# Patient Record
Sex: FEMALE | Race: WHITE | NOT HISPANIC OR LATINO | Employment: OTHER | URBAN - METROPOLITAN AREA
[De-identification: names, ages, dates, MRNs, and addresses within clinical notes are randomized per-mention and may not be internally consistent; named-entity substitution may affect disease eponyms.]

---

## 2017-08-06 ENCOUNTER — HOSPITAL ENCOUNTER (INPATIENT)
Facility: HOSPITAL | Age: 82
LOS: 3 days | Discharge: NURSING, REHAB OR LONG-TERM CARE | DRG: 481 | End: 2017-08-09
Attending: EMERGENCY MEDICINE | Admitting: INTERNAL MEDICINE
Payer: MEDICARE

## 2017-08-06 DIAGNOSIS — Z01.818 PREOPERATIVE CLEARANCE: ICD-10-CM
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
## 2017-08-06 DIAGNOSIS — M25.552 LEFT HIP PAIN: ICD-10-CM

## 2017-08-06 DIAGNOSIS — S72.002A CLOSED FRACTURE OF LEFT HIP REQUIRING OPERATIVE REPAIR, INITIAL ENCOUNTER: ICD-10-CM

## 2017-08-06 DIAGNOSIS — Z01.818 PRE-OP EVALUATION: ICD-10-CM

## 2017-08-06 DIAGNOSIS — Z87.81 S/P ORIF (OPEN REDUCTION INTERNAL FIXATION) FRACTURE: ICD-10-CM

## 2017-08-06 DIAGNOSIS — Z98.890 S/P ORIF (OPEN REDUCTION INTERNAL FIXATION) FRACTURE: ICD-10-CM

## 2017-08-06 DIAGNOSIS — S72.002A CLOSED LEFT HIP FRACTURE: ICD-10-CM

## 2017-08-06 DIAGNOSIS — Z01.811 PRE-OP CHEST EXAM: ICD-10-CM

## 2017-08-06 DIAGNOSIS — S72.002A CLOSED LEFT HIP FRACTURE, INITIAL ENCOUNTER: Primary | ICD-10-CM

## 2017-08-06 DIAGNOSIS — D62 ACUTE BLOOD LOSS ANEMIA: Chronic | ICD-10-CM

## 2017-08-06 PROBLEM — F03.90 DEMENTIA: Chronic | Status: ACTIVE | Noted: 2017-08-06
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
## 2017-08-06 LAB
ABO + RH BLD: NORMAL
ALBUMIN SERPL BCP-MCNC: 3.2 G/DL
ALP SERPL-CCNC: 64 U/L
ALT SERPL W/O P-5'-P-CCNC: 26 U/L
ANION GAP SERPL CALC-SCNC: 9 MMOL/L
AORTIC VALVE REGURGITATION: NORMAL
APTT BLDCRRT: 26.5 SEC
AST SERPL-CCNC: 29 U/L
BASOPHILS # BLD AUTO: 0.02 K/UL
BASOPHILS NFR BLD: 0.3 %
BILIRUB SERPL-MCNC: 0.8 MG/DL
BLD GP AB SCN CELLS X3 SERPL QL: NORMAL
BUN SERPL-MCNC: 13 MG/DL
CALCIUM SERPL-MCNC: 8.5 MG/DL
CHLORIDE SERPL-SCNC: 104 MMOL/L
CK SERPL-CCNC: 66 U/L
CO2 SERPL-SCNC: 24 MMOL/L
CREAT SERPL-MCNC: 0.6 MG/DL
DIASTOLIC DYSFUNCTION: NO
DIFFERENTIAL METHOD: ABNORMAL
EOSINOPHIL # BLD AUTO: 0 K/UL
EOSINOPHIL NFR BLD: 0.3 %
ERYTHROCYTE [DISTWIDTH] IN BLOOD BY AUTOMATED COUNT: 13.5 %
EST. GFR  (AFRICAN AMERICAN): >60 ML/MIN/1.73 M^2
EST. GFR  (NON AFRICAN AMERICAN): >60 ML/MIN/1.73 M^2
ESTIMATED PA SYSTOLIC PRESSURE: 16.65
GLUCOSE SERPL-MCNC: 95 MG/DL
HCT VFR BLD AUTO: 35.3 %
HGB BLD-MCNC: 11.9 G/DL
INR PPP: 1.1
LYMPHOCYTES # BLD AUTO: 0.9 K/UL
LYMPHOCYTES NFR BLD: 11.4 %
MAGNESIUM SERPL-MCNC: 1.7 MG/DL
MCH RBC QN AUTO: 34 PG
MCHC RBC AUTO-ENTMCNC: 33.7 G/DL
MCV RBC AUTO: 101 FL
MONOCYTES # BLD AUTO: 0.6 K/UL
MONOCYTES NFR BLD: 7.7 %
NEUTROPHILS # BLD AUTO: 6.1 K/UL
NEUTROPHILS NFR BLD: 80.3 %
PLATELET # BLD AUTO: 165 K/UL
PMV BLD AUTO: 10.6 FL
POTASSIUM SERPL-SCNC: 4 MMOL/L
PROT SERPL-MCNC: 6.6 G/DL
PROTHROMBIN TIME: 11.1 SEC
RBC # BLD AUTO: 3.5 M/UL
RETIRED EF AND QEF - SEE NOTES: 60 (ref 55–65)
SODIUM SERPL-SCNC: 137 MMOL/L
TROPONIN I SERPL DL<=0.01 NG/ML-MCNC: 0.01 NG/ML
TSH SERPL DL<=0.005 MIU/L-ACNC: 1.25 UIU/ML
WBC # BLD AUTO: 7.56 K/UL

## 2017-08-06 PROCEDURE — C9113 INJ PANTOPRAZOLE SODIUM, VIA: HCPCS | Performed by: EMERGENCY MEDICINE

## 2017-08-06 PROCEDURE — 99285 EMERGENCY DEPT VISIT HI MDM: CPT | Mod: 25

## 2017-08-06 PROCEDURE — 86850 RBC ANTIBODY SCREEN: CPT

## 2017-08-06 PROCEDURE — 93306 TTE W/DOPPLER COMPLETE: CPT

## 2017-08-06 PROCEDURE — 63600175 PHARM REV CODE 636 W HCPCS: Performed by: INTERNAL MEDICINE

## 2017-08-06 PROCEDURE — 84484 ASSAY OF TROPONIN QUANT: CPT

## 2017-08-06 PROCEDURE — 63600175 PHARM REV CODE 636 W HCPCS: Performed by: ORTHOPAEDIC SURGERY

## 2017-08-06 PROCEDURE — S5010 5% DEXTROSE AND 0.45% SALINE: HCPCS | Performed by: ORTHOPAEDIC SURGERY

## 2017-08-06 PROCEDURE — 36415 COLL VENOUS BLD VENIPUNCTURE: CPT

## 2017-08-06 PROCEDURE — 85025 COMPLETE CBC W/AUTO DIFF WBC: CPT

## 2017-08-06 PROCEDURE — 99223 1ST HOSP IP/OBS HIGH 75: CPT | Mod: ,,, | Performed by: INTERNAL MEDICINE

## 2017-08-06 PROCEDURE — 25000003 PHARM REV CODE 250: Performed by: ORTHOPAEDIC SURGERY

## 2017-08-06 PROCEDURE — 51702 INSERT TEMP BLADDER CATH: CPT

## 2017-08-06 PROCEDURE — 93005 ELECTROCARDIOGRAM TRACING: CPT

## 2017-08-06 PROCEDURE — 84443 ASSAY THYROID STIM HORMONE: CPT

## 2017-08-06 PROCEDURE — 82550 ASSAY OF CK (CPK): CPT

## 2017-08-06 PROCEDURE — 85610 PROTHROMBIN TIME: CPT

## 2017-08-06 PROCEDURE — 96372 THER/PROPH/DIAG INJ SC/IM: CPT

## 2017-08-06 PROCEDURE — 63600175 PHARM REV CODE 636 W HCPCS: Performed by: EMERGENCY MEDICINE

## 2017-08-06 PROCEDURE — 83735 ASSAY OF MAGNESIUM: CPT

## 2017-08-06 PROCEDURE — 86900 BLOOD TYPING SEROLOGIC ABO: CPT

## 2017-08-06 PROCEDURE — 80053 COMPREHEN METABOLIC PANEL: CPT

## 2017-08-06 PROCEDURE — 93010 ELECTROCARDIOGRAM REPORT: CPT | Mod: ,,, | Performed by: INTERNAL MEDICINE

## 2017-08-06 PROCEDURE — 93306 TTE W/DOPPLER COMPLETE: CPT | Mod: 26,,, | Performed by: INTERNAL MEDICINE

## 2017-08-06 PROCEDURE — 85730 THROMBOPLASTIN TIME PARTIAL: CPT

## 2017-08-06 PROCEDURE — 11000001 HC ACUTE MED/SURG PRIVATE ROOM

## 2017-08-06 PROCEDURE — 21400001 HC TELEMETRY ROOM

## 2017-08-06 RX ORDER — ONDANSETRON 2 MG/ML
4 INJECTION INTRAMUSCULAR; INTRAVENOUS EVERY 12 HOURS PRN
Status: DISCONTINUED | OUTPATIENT
Start: 2017-08-06 | End: 2017-08-09 | Stop reason: HOSPADM

## 2017-08-06 RX ORDER — MIRTAZAPINE 7.5 MG/1
7.5 TABLET, FILM COATED ORAL NIGHTLY
Status: DISCONTINUED | OUTPATIENT
Start: 2017-08-06 | End: 2017-08-09 | Stop reason: HOSPADM

## 2017-08-06 RX ORDER — DONEPEZIL HYDROCHLORIDE 10 MG/1
10 TABLET, FILM COATED ORAL NIGHTLY
COMMUNITY

## 2017-08-06 RX ORDER — CEFAZOLIN SODIUM 1 G/50ML
1 SOLUTION INTRAVENOUS ONCE
Status: COMPLETED | OUTPATIENT
Start: 2017-08-06 | End: 2017-08-06

## 2017-08-06 RX ORDER — BENZONATATE 200 MG/1
200 CAPSULE ORAL 3 TIMES DAILY PRN
COMMUNITY

## 2017-08-06 RX ORDER — ENOXAPARIN SODIUM 100 MG/ML
40 INJECTION SUBCUTANEOUS ONCE
Status: COMPLETED | OUTPATIENT
Start: 2017-08-06 | End: 2017-08-06

## 2017-08-06 RX ORDER — MIRTAZAPINE 7.5 MG/1
7.5 TABLET, FILM COATED ORAL NIGHTLY
COMMUNITY
End: 2017-08-23 | Stop reason: DRUGHIGH

## 2017-08-06 RX ORDER — SODIUM CHLORIDE 9 MG/ML
INJECTION, SOLUTION INTRAVENOUS CONTINUOUS
Status: DISCONTINUED | OUTPATIENT
Start: 2017-08-06 | End: 2017-08-06

## 2017-08-06 RX ORDER — ACETAMINOPHEN 10 MG/ML
1000 INJECTION, SOLUTION INTRAVENOUS EVERY 8 HOURS
Status: COMPLETED | OUTPATIENT
Start: 2017-08-06 | End: 2017-08-07

## 2017-08-06 RX ORDER — DEXTROSE MONOHYDRATE AND SODIUM CHLORIDE 5; .45 G/100ML; G/100ML
INJECTION, SOLUTION INTRAVENOUS CONTINUOUS
Status: DISCONTINUED | OUTPATIENT
Start: 2017-08-06 | End: 2017-08-06

## 2017-08-06 RX ORDER — DONEPEZIL HYDROCHLORIDE 5 MG/1
10 TABLET, FILM COATED ORAL NIGHTLY
Status: DISCONTINUED | OUTPATIENT
Start: 2017-08-06 | End: 2017-08-09 | Stop reason: HOSPADM

## 2017-08-06 RX ORDER — RISEDRONATE SODIUM 150 MG/1
150 TABLET, FILM COATED ORAL
COMMUNITY

## 2017-08-06 RX ORDER — MUPIROCIN 20 MG/G
1 OINTMENT TOPICAL
Status: DISCONTINUED | OUTPATIENT
Start: 2017-08-06 | End: 2017-08-07

## 2017-08-06 RX ORDER — SODIUM CHLORIDE, SODIUM LACTATE, POTASSIUM CHLORIDE, CALCIUM CHLORIDE 600; 310; 30; 20 MG/100ML; MG/100ML; MG/100ML; MG/100ML
INJECTION, SOLUTION INTRAVENOUS CONTINUOUS
Status: DISCONTINUED | OUTPATIENT
Start: 2017-08-07 | End: 2017-08-08

## 2017-08-06 RX ORDER — PANTOPRAZOLE SODIUM 40 MG/10ML
40 INJECTION, POWDER, LYOPHILIZED, FOR SOLUTION INTRAVENOUS DAILY
Status: DISCONTINUED | OUTPATIENT
Start: 2017-08-06 | End: 2017-08-08

## 2017-08-06 RX ADMIN — PANTOPRAZOLE SODIUM 40 MG: 40 INJECTION, POWDER, FOR SOLUTION INTRAVENOUS at 06:08

## 2017-08-06 RX ADMIN — ACETAMINOPHEN 1000 MG: 10 INJECTION, SOLUTION INTRAVENOUS at 09:08

## 2017-08-06 RX ADMIN — ENOXAPARIN SODIUM 40 MG: 100 INJECTION SUBCUTANEOUS at 06:08

## 2017-08-06 RX ADMIN — DEXTROSE AND SODIUM CHLORIDE: 5; .45 INJECTION, SOLUTION INTRAVENOUS at 04:08

## 2017-08-06 RX ADMIN — CEFAZOLIN SODIUM 1 G: 1 SOLUTION INTRAVENOUS at 06:08

## 2017-08-06 RX ADMIN — ACETAMINOPHEN 1000 MG: 10 INJECTION, SOLUTION INTRAVENOUS at 04:08

## 2017-08-06 NOTE — ASSESSMENT & PLAN NOTE
Fall Vs Syncope   CT of head pending  TSH, Troponin, CPK , Mag pending   2d echo and US carotids pending   Cardiology consult   Neurocheck q4   orthostatic qshift   fall precautions

## 2017-08-06 NOTE — H&P
[facility] Hospital Medicine  History & Physical    Patient Name: Herlinda Tamez  MRN: [id number]  Admission Date: 8/6/2017  Attending Physician: Dayanara Cruz MD   Primary Care Provider: Mckay Hurtado MD         Patient information was obtained from relative(s) and ER records.     Subjective:     Principal Problem:Closed left hip fracture    Chief Complaint:   Chief Complaint   Patient presents with    Fall     pt found on floor with left hip pain, shortening and rotation        HPI: Herlinda Tamez is a 89 y.o. female patient who has PMHx of dementia presents to the Emergency Department for L hip pain which onset gradually PTA. [facility] reports pt falling at nursing home and being found on the floor. Fall was unwitnessed. [facility] reports noticeable L leg shortening. [facility] reports pt being given Tylenol PO at nursing home and Fentanyl IV 75 mg en route.  No associated sxs. Patient denies any HA, numbness, weakness, dizziness, back pain, neck pain, knee pain, abd pain, CP, SOB, and all other sxs at this time. Pt's family reports pt normally is able to walk with a walker. ED evaluation which showed unremarkable lab studies. XR left hip shows there is a slightly comminuted fracture through the intertrochanteric region of the left hip with slight valgus angulation. No dislocation. CXR normal. Ortho consult.     Past Medical History:   Diagnosis Date    Arthritis     Dementia        History reviewed. No pertinent surgical history.    Review of patient's allergies indicates:  No Known Allergies    No current facility-administered medications on file prior to encounter.      No current outpatient prescriptions on file prior to encounter.     Family History     Family history is unknown by patient.        Social History Main Topics    Smoking status: Never Smoker    Smokeless tobacco: Never Used    Alcohol use No    Drug use: No    Sexual activity: Not on file     Review of Systems   Unable to perform  ROS: Dementia     Objective:     Vital Signs (Most Recent):  Temp: 98.7 °F (37.1 °C) (08/06/17 1554)  Pulse: 71 (08/06/17 1554)  Resp: 20 (08/06/17 1554)  BP: 127/72 (08/06/17 1554)  SpO2: 97 % (08/06/17 1554) Vital Signs (24h Range):  Temp:  [97.9 °F (36.6 °C)-98.7 °F (37.1 °C)] 98.7 °F (37.1 °C)  Pulse:  [71-89] 71  Resp:  [16-82] 20  SpO2:  [97 %-99 %] 97 %  BP: (127-152)/(72-83) 127/72     Weight: 35.4 kg (78 lb)  Body mass index is 15.23 kg/m².    Physical Exam   Constitutional: She appears well-developed and well-nourished.   Emaciated    HENT:   Head: Normocephalic and atraumatic.   Eyes: Conjunctivae and EOM are normal. Pupils are equal, round, and reactive to light.   Neck: Normal range of motion. Neck supple.   Cardiovascular: Normal rate, regular rhythm, normal heart sounds and intact distal pulses.    Pulmonary/Chest: Effort normal and breath sounds normal.   Abdominal: Soft. Bowel sounds are normal.   Musculoskeletal:   L leg externally rotated and shortened. Pain w/ ROM. 2+ Pedal pulse    Neurological: She is alert. She has normal reflexes. She is disoriented.   Skin: Skin is warm and dry.   Psychiatric: She has a normal mood and affect. Her behavior is normal. Judgment and thought content normal.   Nursing note and vitals reviewed.       Significant Labs:   BMP:   Recent Labs  Lab 08/06/17  1207   GLU 95      K 4.0      CO2 24   BUN 13   CREATININE 0.6   CALCIUM 8.5*     CBC:   Recent Labs  Lab 08/06/17  1207   WBC 7.56   HGB 11.9*   HCT 35.3*        CMP:   Recent Labs  Lab 08/06/17  1207      K 4.0      CO2 24   GLU 95   BUN 13   CREATININE 0.6   CALCIUM 8.5*   PROT 6.6   ALBUMIN 3.2*   BILITOT 0.8   ALKPHOS 64   AST 29   ALT 26   ANIONGAP 9   EGFRNONAA >60     Magnesium: No results for input(s): MG in the last 48 hours.  Troponin: No results for input(s): TROPONINI in the last 48 hours.  TSH: No results for input(s): TSH in the last 4320 hours.  All pertinent labs  within the past 24 hours have been reviewed.    Significant Imaging:   Imaging Results          X-Ray Chest AP Portable (Final result)  Result time 08/06/17 12:17:05    Final result by Alec Carey MD (08/06/17 12:17:05)                 Impression:     Normal chest      Electronically signed by: ALEC CAREY MD  Date:     08/06/17  Time:    12:17              Narrative:    Portable chest    Clinical indication blunt chest trauma and preop    Findings: The heart and lungs are normal.  There are no infiltrates.                             X-Ray Hip 2 View Left (Final result)  Result time 08/06/17 12:16:40    Final result by Alec Carey MD (08/06/17 12:16:40)                 Impression:     Slightly angled intertrochanteric fracture of the left hip      Electronically signed by: ALEC CAREY MD  Date:     08/06/17  Time:    12:16              Narrative:    Left hip 3 views    Clinical indication: Hip pain and trauma    Findings: There is a slightly comminuted fracture through the intertrochanteric region of the left hip with slight valgus angulation.  No dislocation.                            Assessment/Plan:     Fall from ground level    Fall Vs Syncope   CT of head pending  TSH, Troponin, CPK , Mag pending   2d echo and US carotids pending   Cardiology consult   Neurocheck q4   orthostatic qshift   fall precautions               Dementia    Resume home meds   Fall precautions            * Closed left hip fracture s/p ground level fall    -Ortho consult   -XR left hip Slightly angled intertrochanteric fracture of the left hip  -PRN analgesics   -LLE neurovascular and circulation checks q shift   -Possible surgery in the AM  -Fall precautions                 VTE Risk Mitigation         Ordered     enoxaparin injection 40 mg  Once     Route:  Subcutaneous        08/06/17 1616     Medium Risk of VTE  Once      08/06/17 1532     Place TANIA hose  Until discontinued      08/06/17 1532     Place sequential  compression device  Until discontinued      08/06/17 1532     Place sequential compression device  Until discontinued      08/06/17 1531             Jenny Norman NP  Department of Hospital Medicine   [facility]

## 2017-08-06 NOTE — SUBJECTIVE & OBJECTIVE
Past Medical History:   Diagnosis Date    Arthritis     Dementia        History reviewed. No pertinent surgical history.    Review of patient's allergies indicates:  No Known Allergies    No current facility-administered medications on file prior to encounter.      No current outpatient prescriptions on file prior to encounter.     Family History     Family history is unknown by patient.        Social History Main Topics    Smoking status: Never Smoker    Smokeless tobacco: Never Used    Alcohol use No    Drug use: No    Sexual activity: Not on file     Review of Systems   Unable to perform ROS: Dementia     Objective:     Vital Signs (Most Recent):  Temp: 98.7 °F (37.1 °C) (08/06/17 1554)  Pulse: 71 (08/06/17 1554)  Resp: 20 (08/06/17 1554)  BP: 127/72 (08/06/17 1554)  SpO2: 97 % (08/06/17 1554) Vital Signs (24h Range):  Temp:  [97.9 °F (36.6 °C)-98.7 °F (37.1 °C)] 98.7 °F (37.1 °C)  Pulse:  [71-89] 71  Resp:  [16-82] 20  SpO2:  [97 %-99 %] 97 %  BP: (127-152)/(72-83) 127/72     Weight: 35.4 kg (78 lb)  Body mass index is 15.23 kg/m².    Physical Exam   Constitutional: She appears well-developed and well-nourished.   Emaciated    HENT:   Head: Normocephalic and atraumatic.   Eyes: Conjunctivae and EOM are normal. Pupils are equal, round, and reactive to light.   Neck: Normal range of motion. Neck supple.   Cardiovascular: Normal rate, regular rhythm, normal heart sounds and intact distal pulses.    Pulmonary/Chest: Effort normal and breath sounds normal.   Abdominal: Soft. Bowel sounds are normal.   Musculoskeletal:   L leg externally rotated and shortened. Pain w/ ROM. 2+ Pedal pulse    Neurological: She is alert. She has normal reflexes. She is disoriented.   Skin: Skin is warm and dry.   Psychiatric: She has a normal mood and affect. Her behavior is normal. Judgment and thought content normal.   Nursing note and vitals reviewed.       Significant Labs:   BMP:   Recent Labs  Lab 08/06/17  1207   GLU 95       K 4.0      CO2 24   BUN 13   CREATININE 0.6   CALCIUM 8.5*     CBC:   Recent Labs  Lab 08/06/17  1207   WBC 7.56   HGB 11.9*   HCT 35.3*        CMP:   Recent Labs  Lab 08/06/17  1207      K 4.0      CO2 24   GLU 95   BUN 13   CREATININE 0.6   CALCIUM 8.5*   PROT 6.6   ALBUMIN 3.2*   BILITOT 0.8   ALKPHOS 64   AST 29   ALT 26   ANIONGAP 9   EGFRNONAA >60     Magnesium: No results for input(s): MG in the last 48 hours.  Troponin: No results for input(s): TROPONINI in the last 48 hours.  TSH: No results for input(s): TSH in the last 4320 hours.  All pertinent labs within the past 24 hours have been reviewed.    Significant Imaging:   Imaging Results          X-Ray Chest AP Portable (Final result)  Result time 08/06/17 12:17:05    Final result by Alec Carey MD (08/06/17 12:17:05)                 Impression:     Normal chest      Electronically signed by: ALEC CAREY MD  Date:     08/06/17  Time:    12:17              Narrative:    Portable chest    Clinical indication blunt chest trauma and preop    Findings: The heart and lungs are normal.  There are no infiltrates.                             X-Ray Hip 2 View Left (Final result)  Result time 08/06/17 12:16:40    Final result by Alec Carey MD (08/06/17 12:16:40)                 Impression:     Slightly angled intertrochanteric fracture of the left hip      Electronically signed by: ALEC CAREY MD  Date:     08/06/17  Time:    12:16              Narrative:    Left hip 3 views    Clinical indication: Hip pain and trauma    Findings: There is a slightly comminuted fracture through the intertrochanteric region of the left hip with slight valgus angulation.  No dislocation.

## 2017-08-06 NOTE — CONSULTS
[facility]   Cardiology  Consult Note    Patient Name: Herlinda Tamez  MRN: [id number]  Admission Date: 8/6/2017  Hospital Length of Stay: 0 days  Code Status: Full Code   Attending Provider: Dayanara Torres MD   Consulting Provider: Taylor Hernandez MD  Primary Care Physician: Mckay Hurtado MD  Principal Problem:Closed left hip fracture    Patient information was obtained from patient and ER records.     Inpatient consult to Cardiology  Consult performed by: TAYLOR HERNANDEZ  Consult ordered by: DAYANARA TORRES       preop clearance  Subjective:     Chief Complaint:  Slightly angled intertrochanteric fracture of the left hip.    HPI: 89 yo female, NH resident, PMH dementia and OA.  Per family members,  pt fell at nursing home and being found on the floor this morning. Fall was unwitnessed. Pt is walker-dependent with decent exercise. No chest pain, dyspnea, palpitation, orthopnea and syncope.   In ER, XRAY showed slightly angled intertrochanteric fracture of the left hip. EKG showed NSR and no acute stt change. Vitals stable and labs wnl. Plan to have [id number]-[id number].      Past Medical History:   Diagnosis Date    Arthritis     Dementia        History reviewed. No pertinent surgical history.    Review of patient's allergies indicates:  No Known Allergies    No current facility-administered medications on file prior to encounter.      No current outpatient prescriptions on file prior to encounter.     Family History     Family history is unknown by patient.        Social History Main Topics    Smoking status: Never Smoker    Smokeless tobacco: Never Used    Alcohol use No    Drug use: No    Sexual activity: Not on file     Review of Systems   Constitution: Positive for weakness. Negative for decreased appetite, diaphoresis, fever, malaise/fatigue and night sweats.   HENT: Negative for headaches and nosebleeds.    Eyes: Negative for blurred vision and double vision.   Cardiovascular:  Negative for chest pain, claudication, dyspnea on exertion, irregular heartbeat, leg swelling, near-syncope, orthopnea, palpitations, paroxysmal nocturnal dyspnea and syncope.   Respiratory: Negative for cough, shortness of breath, sleep disturbances due to breathing, snoring, sputum production and wheezing.    Endocrine: Negative for cold intolerance and polyuria.   Hematologic/Lymphatic: Does not bruise/bleed easily.   Skin: Negative for rash.   Musculoskeletal: Positive for arthritis and falls. Negative for back pain, joint pain, joint swelling and neck pain.   Gastrointestinal: Negative for abdominal pain, heartburn, nausea and vomiting.   Genitourinary: Negative for dysuria, frequency and hematuria.   Neurological: Negative for difficulty with concentration, dizziness, focal weakness, light-headedness, numbness and seizures.   Psychiatric/Behavioral: Negative for depression, memory loss and substance abuse. The patient does not have insomnia.    Allergic/Immunologic: Negative for HIV exposure and hives.     Objective:     Vital Signs (Most Recent):  Temp: 98.7 °F (37.1 °C) (08/06/17 1554)  Pulse: 71 (08/06/17 1554)  Resp: 20 (08/06/17 1554)  BP: 127/72 (08/06/17 1554)  SpO2: 97 % (08/06/17 1554) Vital Signs (24h Range):  Temp:  [97.9 °F (36.6 °C)-98.7 °F (37.1 °C)] 98.7 °F (37.1 °C)  Pulse:  [71-89] 71  Resp:  [16-82] 20  SpO2:  [97 %-99 %] 97 %  BP: (127-152)/(72-83) 127/72     Weight: 35.4 kg (78 lb)  Body mass index is 15.23 kg/m².    SpO2: 97 %  O2 Device (Oxygen Therapy): room air    No intake or output data in the 24 hours ending 08/06/17 1636    Lines/Drains/Airways     Drain                 Urethral Catheter 08/06/17 1530 Double-lumen;Latex 16 Fr. less than 1 day          Peripheral Intravenous Line                 Peripheral IV - Single Lumen Left Wrist 00310 days         Peripheral IV - Single Lumen 08/06/17 Left Hand less than 1 day                Physical Exam   Constitutional: She appears  well-nourished.   HENT:   Head: Normocephalic.   Eyes: Pupils are equal, round, and reactive to light.   Neck: Normal carotid pulses and no JVD present. Carotid bruit is not present. No thyromegaly present.   Cardiovascular: Normal rate, regular rhythm, normal heart sounds and normal pulses.   No extrasystoles are present. PMI is not displaced.  Exam reveals no gallop and no S3.    No murmur heard.  Pulmonary/Chest: Breath sounds normal. No stridor. No respiratory distress.   Abdominal: Soft. Bowel sounds are normal. There is no tenderness. There is no rebound.   Neurological: She is alert.   Skin: Skin is intact. No rash noted.   Psychiatric: Her behavior is normal.       Significant Labs:   ABG: No results for input(s): PH, PCO2, HCO3, POCSATURATED, BE in the last 48 hours., Blood Culture: No results for input(s): LABBLOO in the last 48 hours., BMP:   Recent Labs  Lab 08/06/17  1207   GLU 95      K 4.0      CO2 24   BUN 13   CREATININE 0.6   CALCIUM 8.5*   , CMP   Recent Labs  Lab 08/06/17  1207      K 4.0      CO2 24   GLU 95   BUN 13   CREATININE 0.6   CALCIUM 8.5*   PROT 6.6   ALBUMIN 3.2*   BILITOT 0.8   ALKPHOS 64   AST 29   ALT 26   ANIONGAP 9   ESTGFRAFRICA >60   EGFRNONAA >60   , CBC   Recent Labs  Lab 08/06/17  1207   WBC 7.56   HGB 11.9*   HCT 35.3*      , INR   Recent Labs  Lab 08/06/17  1207   INR 1.1   , Lipid Panel No results for input(s): CHOL, HDL, LDLCALC, TRIG, CHOLHDL in the last 48 hours. and Troponin No results for input(s): TROPONINI in the last 48 hours.    Significant Imaging: X-Ray: CXR: X-Ray Chest 1 View (CXR): No results found for this visit on 08/06/17.  Assessment and Plan:       Preop clearance of Qwk-vukepiarg-kwgicaqkuqogdy for angled intertrochanteric fracture of the left hip.  Dementia    Plan:  Echo pending.  Elevated periop risk of CV events due to advanced age and general weakness for moderate risk procedure.  decent functional and exercise  capacity.  No chest pain, active arrhythmia and CHF symptoms.  Avoid periop fluid overloaded.    Addendum  Reviewed ECHo, normal EF and mild AI  Ok to proceed the scheduled surgery without further cardiac study.  Avoid periop fluid overloaded.    Active Diagnoses:    Diagnosis Date Noted POA    PRINCIPAL PROBLEM:  Closed left hip fracture s/p ground level fall [S72.002A] 08/06/2017 Yes    Dementia [F03.90] 08/06/2017 Yes     Chronic      Problems Resolved During this Admission:    Diagnosis Date Noted Date Resolved POA       VTE Risk Mitigation         Ordered     enoxaparin injection 40 mg  Once     Route:  Subcutaneous        08/06/17 1616     Medium Risk of VTE  Once      08/06/17 1532     Place TANIA hose  Until discontinued      08/06/17 1532     Place sequential compression device  Until discontinued      08/06/17 1532     Place sequential compression device  Until discontinued      08/06/17 1531          Thank you for your consult. I will follow-up with patient. Please contact us if you have any additional questions.    Javier Hernandez MD  Cardiology   [facility]

## 2017-08-06 NOTE — ED PROVIDER NOTES
SCRIBE #1 NOTE: I, Maryjane Ansari, am scribing for, and in the presence of, Colin Astudillo MD. I have scribed the entire note.      History      Chief Complaint   Patient presents with    Fall     pt found on floor with left hip pain, shortening and rotation       Review of patient's allergies indicates:  No Known Allergies     HPI   HPI    8/6/2017, 11:38 AM   History obtained from the patient      History of Present Illness: Herlinda Tamez is a 89 y.o. female patient who has MHx of dementia presents to the Emergency Department for L hip pain which onset gradually PTA. [facility] reports pt falling at nursing home and being found on the floor. Fall was unwitnessed. [facility] reports noticeable L leg shortening. [facility] reports pt being given Tylenol PO at nursing home and Fentanyl IV 75 mg en route. Symptoms are constant and moderate in severity. No mitigating or exacerbating factors reported. No associated sxs. Patient denies any head trauma, syncope, HA, numbness, weakness, dizziness, back pain, neck pain, knee pain, abd pain, CP, SOB, and all other sxs at this time. Pt's family reports pt normally being able to walk with a walker. Pt reports not eating anything today. No further complaints or concerns at this time.         Arrival mode:  [facility]    PCP: Mckay Hurtado MD       Past Medical History:  Past Medical History:   Diagnosis Date    Arthritis     Dementia        Past Surgical History:  History reviewed. No pertinent surgical history.      Family History:  Family History   Problem Relation Age of Onset    Family history unknown: Yes       Social History:  Social History     Social History Main Topics    Smoking status: Never Smoker    Smokeless tobacco: Never Used    Alcohol use No    Drug use: No    Sexual activity: Not given       ROS   Review of Systems   Constitutional: Negative for fever.        (-) head trauma    HENT: Negative for sore throat.    Respiratory: Negative for shortness of breath.    Cardiovascular:  Negative for chest pain.   Gastrointestinal: Negative for abdominal pain and nausea.   Genitourinary: Negative for dysuria.   Musculoskeletal: Positive for arthralgias (L hip ). Negative for back pain and neck pain.        (-) knee pain   Skin: Negative for rash.   Neurological: Negative for dizziness, syncope, weakness, numbness and headaches.   Hematological: Does not bruise/bleed easily.   All other systems reviewed and are negative.    Physical Exam      Initial Vitals [08/06/17 1131]   BP Pulse Resp Temp SpO2   (!) 145/83 89 16 97.9 °F (36.6 °C) 99 %      MAP       103.67          Physical Exam  Nursing Notes and Vital Signs Reviewed.  Constitutional: Patient is in no apparent distress. Frail and thin.  Head: Atraumatic. Normocephalic.  Eyes: PERRL. EOM intact. Conjunctivae are not pale. No scleral icterus.  ENT: Mucous membranes are mildly dry. Oropharynx is clear and symmetric.    Neck: Supple. Full ROM. No lymphadenopathy.  Cardiovascular: Regular rate. Regular rhythm. No murmurs, rubs, or gallops. Distal pulses are 2+ and symmetric.  Pulmonary/Chest: No respiratory distress. Clear to auscultation bilaterally. No wheezing, rales, or rhonchi.  Abdominal: Soft and non-distended. There is no tenderness. No rebound, guarding, or rigidity. Good bowel sounds.  Genitourinary: No CVA tenderness  Musculoskeletal: No edema. No calf tenderness.  LLE: L leg externally rotated and shortened. Pain w/ ROM.   Skin: Warm and dry.  Neurological:  Alert, awake, and appropriate.  Normal speech.  No acute focal neurological deficits are appreciated.  Psychiatric: Normal affect. Good eye contact. Appropriate in content.    ED Course    Procedures  ED Vital Signs:  Vitals:    08/06/17 1131   BP: (!) 145/83   Pulse: 89   Resp: 16   Temp: 97.9 °F (36.6 °C)   TempSrc: Oral   SpO2: 99%   Weight: 35.4 kg (78 lb)       Abnormal Lab Results:  Labs Reviewed   CBC W/ AUTO DIFFERENTIAL - Abnormal; Notable for the following:        Result  Value    RBC 3.50 (*)     Hemoglobin 11.9 (*)     Hematocrit 35.3 (*)      (*)     MCH 34.0 (*)     Lymph # 0.9 (*)     Gran% 80.3 (*)     Lymph% 11.4 (*)     All other components within normal limits   COMPREHENSIVE METABOLIC PANEL - Abnormal; Notable for the following:     Calcium 8.5 (*)     Albumin 3.2 (*)     All other components within normal limits   PROTIME-INR   APTT   TYPE & SCREEN        All Lab Results:  Results for orders placed or performed during the hospital encounter of 08/06/17   CBC auto differential   Result Value Ref Range    WBC 7.56 3.90 - 12.70 K/uL    RBC 3.50 (L) 4.00 - 5.40 M/uL    Hemoglobin 11.9 (L) 12.0 - 16.0 g/dL    Hematocrit 35.3 (L) 37.0 - 48.5 %     (H) 82 - 98 fL    MCH 34.0 (H) 27.0 - 31.0 pg    MCHC 33.7 32.0 - 36.0 g/dL    RDW 13.5 11.5 - 14.5 %    Platelets 165 150 - 350 K/uL    MPV 10.6 9.2 - 12.9 fL    Gran # 6.1 1.8 - 7.7 K/uL    Lymph # 0.9 (L) 1.0 - 4.8 K/uL    Mono # 0.6 0.3 - 1.0 K/uL    Eos # 0.0 0.0 - 0.5 K/uL    Baso # 0.02 0.00 - 0.20 K/uL    Gran% 80.3 (H) 38.0 - 73.0 %    Lymph% 11.4 (L) 18.0 - 48.0 %    Mono% 7.7 4.0 - 15.0 %    Eosinophil% 0.3 0.0 - 8.0 %    Basophil% 0.3 0.0 - 1.9 %    Differential Method Automated    Comprehensive metabolic panel   Result Value Ref Range    Sodium 137 136 - 145 mmol/L    Potassium 4.0 3.5 - 5.1 mmol/L    Chloride 104 95 - 110 mmol/L    CO2 24 23 - 29 mmol/L    Glucose 95 70 - 110 mg/dL    BUN, Bld 13 10 - 30 mg/dL    Creatinine 0.6 0.5 - 1.4 mg/dL    Calcium 8.5 (L) 8.7 - 10.5 mg/dL    Total Protein 6.6 6.0 - 8.4 g/dL    Albumin 3.2 (L) 3.5 - 5.2 g/dL    Total Bilirubin 0.8 0.1 - 1.0 mg/dL    Alkaline Phosphatase 64 55 - 135 U/L    AST 29 10 - 40 U/L    ALT 26 10 - 44 U/L    Anion Gap 9 8 - 16 mmol/L    eGFR if African American >60 >60 mL/min/1.73 m^2    eGFR if non African American >60 >60 mL/min/1.73 m^2   Protime-INR   Result Value Ref Range    Prothrombin Time 11.1 9.0 - 12.5 sec    INR 1.1 0.8 - 1.2   APTT    Result Value Ref Range    aPTT 26.5 21.0 - 32.0 sec         Imaging Results:  Imaging Results          X-Ray Chest AP Portable (Final result)  Result time 08/06/17 12:17:05    Final result by Alec Reza MD (08/06/17 12:17:05)                 Impression:     Normal chest      Electronically signed by: ALEC REZA MD  Date:     08/06/17  Time:    12:17              Narrative:    Portable chest    Clinical indication blunt chest trauma and preop    Findings: The heart and lungs are normal.  There are no infiltrates.                             X-Ray Hip 2 View Left (Final result)  Result time 08/06/17 12:16:40    Final result by Alec Reza MD (08/06/17 12:16:40)                 Impression:     Slightly angled intertrochanteric fracture of the left hip      Electronically signed by: ALEC REZA MD  Date:     08/06/17  Time:    12:16              Narrative:    Left hip 3 views    Clinical indication: Hip pain and trauma    Findings: There is a slightly comminuted fracture through the intertrochanteric region of the left hip with slight valgus angulation.  No dislocation.                             The EKG was ordered, reviewed, and independently interpreted by the ED provider.  Interpretation time: 11:50  Rate: 79 BPM  Rhythm: normal sinus rhythm  Interpretation: Occasional PAC. No STEMI.           The Emergency Provider reviewed the vital signs and test results, which are outlined above.    ED Discussion   12:44 PM: Dr. Astudillo discussed the pt's case with Dr. Tello (orthopedics) who recommends keeping pt NPO.    12:54 PM: Discussed case with Kathleen Flor NP. Kathleen Flor NP agrees with current care and management of pt and accepts admission.   Admitting Service: Hospital medicine   Admitting Physician: Dr. Sabillon   Admit to: Med-surg    12:54 PM: Re-evaluated pt. I have discussed test results, shared treatment plan, and the need for admission with patient and family at bedside. Pt and  family express understanding at this time and agree with all information. All questions answered. Pt and family have no further questions or concerns at this time. Pt is ready for admit.      ED Medication(s):  Medications - No data to display    New Prescriptions    No medications on file             Medical Decision Making    Medical Decision Making:   Clinical Tests:   Lab Tests: Reviewed and Ordered  Radiological Study: Reviewed and Ordered  Medical Tests: Reviewed and Ordered           Scribe Attestation:   Scribe #1: I performed the above scribed service and the documentation accurately describes the services I performed. I attest to the accuracy of the note.    Attending:   Physician Attestation Statement for Scribe #1: I, Colin Astudillo MD, personally performed the services described in this documentation, as scribed by Maryjane Ansari, in my presence, and it is both accurate and complete.          Clinical Impression       ICD-10-CM ICD-9-CM   1. Closed left hip fracture, initial encounter S72.002A 820.8   2. Left hip pain M25.552 719.45   3. Pre-op chest exam Z01.811 V72.82   4. Pre-op evaluation Z01.818 V72.84       Disposition:   Disposition: Admitted  Condition: Fair         Colin Astudillo MD  08/06/17 0246

## 2017-08-06 NOTE — PLAN OF CARE
Problem: Patient Care Overview  Goal: Plan of Care Review  Outcome: Ongoing (interventions implemented as appropriate)  Received to room 564 via stretcher from ED, transferred to bed per staff. Remains injury free. Resendez draing clear urine to gravity. IV abx . Possible sx tomorrow. Will monitor. Family at bedside. Patient is confused and disoriented.

## 2017-08-06 NOTE — HPI
Herlinda Tamez is a 89 y.o. female patient who has PMHx of dementia presents to the Emergency Department for L hip pain which onset gradually PTA. [facility] reports pt falling at nursing home and being found on the floor. Fall was unwitnessed. [facility] reports noticeable L leg shortening. [facility] reports pt being given Tylenol PO at nursing home and Fentanyl IV 75 mg en route.  No associated sxs. Patient denies any HA, numbness, weakness, dizziness, back pain, neck pain, knee pain, abd pain, CP, SOB, and all other sxs at this time. Pt's family reports pt normally is able to walk with a walker. ED evaluation which showed unremarkable lab studies. XR left hip shows there is a slightly comminuted fracture through the intertrochanteric region of the left hip with slight valgus angulation. No dislocation. CXR normal. Ortho consult.

## 2017-08-06 NOTE — ASSESSMENT & PLAN NOTE
-Ortho consult   -XR left hip Slightly angled intertrochanteric fracture of the left hip  -PRN analgesics   -LLE neurovascular and circulation checks q shift   -Possible surgery in the AM  -Fall precautions

## 2017-08-07 ENCOUNTER — ANESTHESIA (OUTPATIENT)
Dept: SURGERY | Facility: HOSPITAL | Age: 82
DRG: 481 | End: 2017-08-07
Payer: MEDICARE

## 2017-08-07 ENCOUNTER — ANESTHESIA EVENT (OUTPATIENT)
Dept: SURGERY | Facility: HOSPITAL | Age: 82
DRG: 481 | End: 2017-08-07
Payer: MEDICARE

## 2017-08-07 PROCEDURE — 71000033 HC RECOVERY, INTIAL HOUR: Performed by: ORTHOPAEDIC SURGERY

## 2017-08-07 PROCEDURE — 63600175 PHARM REV CODE 636 W HCPCS: Performed by: ORTHOPAEDIC SURGERY

## 2017-08-07 PROCEDURE — 36000711: Performed by: ORTHOPAEDIC SURGERY

## 2017-08-07 PROCEDURE — 27201423 OPTIME MED/SURG SUP & DEVICES STERILE SUPPLY: Performed by: ORTHOPAEDIC SURGERY

## 2017-08-07 PROCEDURE — 25000003 PHARM REV CODE 250: Performed by: ORTHOPAEDIC SURGERY

## 2017-08-07 PROCEDURE — 37000008 HC ANESTHESIA 1ST 15 MINUTES: Performed by: ORTHOPAEDIC SURGERY

## 2017-08-07 PROCEDURE — 25000003 PHARM REV CODE 250: Performed by: NURSE PRACTITIONER

## 2017-08-07 PROCEDURE — C9113 INJ PANTOPRAZOLE SODIUM, VIA: HCPCS | Performed by: EMERGENCY MEDICINE

## 2017-08-07 PROCEDURE — 25000003 PHARM REV CODE 250: Performed by: NURSE ANESTHETIST, CERTIFIED REGISTERED

## 2017-08-07 PROCEDURE — 27245 TREAT THIGH FRACTURE: CPT | Mod: LT,,, | Performed by: ORTHOPAEDIC SURGERY

## 2017-08-07 PROCEDURE — 11000001 HC ACUTE MED/SURG PRIVATE ROOM

## 2017-08-07 PROCEDURE — 25000003 PHARM REV CODE 250: Performed by: INTERNAL MEDICINE

## 2017-08-07 PROCEDURE — 21400001 HC TELEMETRY ROOM

## 2017-08-07 PROCEDURE — 36000710: Performed by: ORTHOPAEDIC SURGERY

## 2017-08-07 PROCEDURE — 63600175 PHARM REV CODE 636 W HCPCS: Performed by: EMERGENCY MEDICINE

## 2017-08-07 PROCEDURE — 63600175 PHARM REV CODE 636 W HCPCS: Performed by: INTERNAL MEDICINE

## 2017-08-07 PROCEDURE — 0QS736Z REPOSITION LEFT UPPER FEMUR WITH INTRAMEDULLARY INTERNAL FIXATION DEVICE, PERCUTANEOUS APPROACH: ICD-10-PCS | Performed by: ORTHOPAEDIC SURGERY

## 2017-08-07 PROCEDURE — C1769 GUIDE WIRE: HCPCS | Performed by: ORTHOPAEDIC SURGERY

## 2017-08-07 PROCEDURE — 99900035 HC TECH TIME PER 15 MIN (STAT)

## 2017-08-07 PROCEDURE — 63600175 PHARM REV CODE 636 W HCPCS: Performed by: NURSE ANESTHETIST, CERTIFIED REGISTERED

## 2017-08-07 PROCEDURE — C1713 ANCHOR/SCREW BN/BN,TIS/BN: HCPCS | Performed by: ORTHOPAEDIC SURGERY

## 2017-08-07 PROCEDURE — 37000009 HC ANESTHESIA EA ADD 15 MINS: Performed by: ORTHOPAEDIC SURGERY

## 2017-08-07 DEVICE — IMPLANTABLE DEVICE: Type: IMPLANTABLE DEVICE | Site: HIP | Status: FUNCTIONAL

## 2017-08-07 RX ORDER — LIDOCAINE HYDROCHLORIDE 10 MG/ML
INJECTION INFILTRATION; PERINEURAL
Status: DISCONTINUED | OUTPATIENT
Start: 2017-08-07 | End: 2017-08-07

## 2017-08-07 RX ORDER — CEFAZOLIN SODIUM 1 G/50ML
1 SOLUTION INTRAVENOUS
Status: DISPENSED | OUTPATIENT
Start: 2017-08-07 | End: 2017-08-08

## 2017-08-07 RX ORDER — ONDANSETRON 2 MG/ML
4 INJECTION INTRAMUSCULAR; INTRAVENOUS DAILY PRN
Status: DISCONTINUED | OUTPATIENT
Start: 2017-08-07 | End: 2017-08-07

## 2017-08-07 RX ORDER — BISACODYL 10 MG
10 SUPPOSITORY, RECTAL RECTAL DAILY PRN
Status: DISCONTINUED | OUTPATIENT
Start: 2017-08-07 | End: 2017-08-09 | Stop reason: HOSPADM

## 2017-08-07 RX ORDER — DIPHENHYDRAMINE HYDROCHLORIDE 50 MG/ML
25 INJECTION INTRAMUSCULAR; INTRAVENOUS EVERY 6 HOURS PRN
Status: DISCONTINUED | OUTPATIENT
Start: 2017-08-07 | End: 2017-08-07

## 2017-08-07 RX ORDER — PHENYLEPHRINE HYDROCHLORIDE 10 MG/ML
INJECTION INTRAVENOUS
Status: DISCONTINUED | OUTPATIENT
Start: 2017-08-07 | End: 2017-08-07

## 2017-08-07 RX ORDER — HYDROMORPHONE HYDROCHLORIDE 2 MG/ML
0.2 INJECTION, SOLUTION INTRAMUSCULAR; INTRAVENOUS; SUBCUTANEOUS EVERY 5 MIN PRN
Status: DISCONTINUED | OUTPATIENT
Start: 2017-08-07 | End: 2017-08-07

## 2017-08-07 RX ORDER — SODIUM CHLORIDE 0.9 % (FLUSH) 0.9 %
3 SYRINGE (ML) INJECTION
Status: DISCONTINUED | OUTPATIENT
Start: 2017-08-07 | End: 2017-08-09 | Stop reason: HOSPADM

## 2017-08-07 RX ORDER — ROCURONIUM BROMIDE 10 MG/ML
INJECTION, SOLUTION INTRAVENOUS
Status: DISCONTINUED | OUTPATIENT
Start: 2017-08-07 | End: 2017-08-07

## 2017-08-07 RX ORDER — ACETAMINOPHEN 325 MG/1
650 TABLET ORAL EVERY 6 HOURS
Status: DISCONTINUED | OUTPATIENT
Start: 2017-08-07 | End: 2017-08-09 | Stop reason: HOSPADM

## 2017-08-07 RX ORDER — SODIUM CHLORIDE, SODIUM LACTATE, POTASSIUM CHLORIDE, CALCIUM CHLORIDE 600; 310; 30; 20 MG/100ML; MG/100ML; MG/100ML; MG/100ML
INJECTION, SOLUTION INTRAVENOUS CONTINUOUS PRN
Status: DISCONTINUED | OUTPATIENT
Start: 2017-08-07 | End: 2017-08-07

## 2017-08-07 RX ORDER — FENTANYL CITRATE 50 UG/ML
INJECTION, SOLUTION INTRAMUSCULAR; INTRAVENOUS
Status: DISCONTINUED | OUTPATIENT
Start: 2017-08-07 | End: 2017-08-07

## 2017-08-07 RX ORDER — ONDANSETRON 2 MG/ML
INJECTION INTRAMUSCULAR; INTRAVENOUS
Status: DISCONTINUED | OUTPATIENT
Start: 2017-08-07 | End: 2017-08-07

## 2017-08-07 RX ORDER — FENTANYL CITRATE 50 UG/ML
25 INJECTION, SOLUTION INTRAMUSCULAR; INTRAVENOUS EVERY 5 MIN PRN
Status: DISCONTINUED | OUTPATIENT
Start: 2017-08-07 | End: 2017-08-07

## 2017-08-07 RX ORDER — ETOMIDATE 2 MG/ML
INJECTION INTRAVENOUS
Status: DISCONTINUED | OUTPATIENT
Start: 2017-08-07 | End: 2017-08-07

## 2017-08-07 RX ORDER — NEOSTIGMINE METHYLSULFATE 1 MG/ML
INJECTION, SOLUTION INTRAVENOUS
Status: DISCONTINUED | OUTPATIENT
Start: 2017-08-07 | End: 2017-08-07

## 2017-08-07 RX ORDER — HYDROMORPHONE HYDROCHLORIDE 2 MG/ML
0.2 INJECTION, SOLUTION INTRAMUSCULAR; INTRAVENOUS; SUBCUTANEOUS EVERY 6 HOURS PRN
Status: DISCONTINUED | OUTPATIENT
Start: 2017-08-07 | End: 2017-08-09 | Stop reason: HOSPADM

## 2017-08-07 RX ORDER — HEPARIN SODIUM 5000 [USP'U]/ML
5000 INJECTION, SOLUTION INTRAVENOUS; SUBCUTANEOUS EVERY 8 HOURS
Status: DISCONTINUED | OUTPATIENT
Start: 2017-08-07 | End: 2017-08-08

## 2017-08-07 RX ORDER — OXYCODONE HYDROCHLORIDE 5 MG/1
5 TABLET ORAL EVERY 6 HOURS PRN
Status: DISCONTINUED | OUTPATIENT
Start: 2017-08-07 | End: 2017-08-09 | Stop reason: HOSPADM

## 2017-08-07 RX ORDER — SODIUM CHLORIDE 0.9 % (FLUSH) 0.9 %
3 SYRINGE (ML) INJECTION EVERY 8 HOURS
Status: DISCONTINUED | OUTPATIENT
Start: 2017-08-07 | End: 2017-08-07

## 2017-08-07 RX ORDER — BACITRACIN 50000 [IU]/1
INJECTION, POWDER, FOR SOLUTION INTRAMUSCULAR
Status: DISCONTINUED | OUTPATIENT
Start: 2017-08-07 | End: 2017-08-07 | Stop reason: HOSPADM

## 2017-08-07 RX ORDER — GLYCOPYRROLATE 0.2 MG/ML
INJECTION INTRAMUSCULAR; INTRAVENOUS
Status: DISCONTINUED | OUTPATIENT
Start: 2017-08-07 | End: 2017-08-07

## 2017-08-07 RX ORDER — OXYCODONE HYDROCHLORIDE 5 MG/1
5 TABLET ORAL
Status: DISCONTINUED | OUTPATIENT
Start: 2017-08-07 | End: 2017-08-07

## 2017-08-07 RX ORDER — SODIUM CHLORIDE AND POTASSIUM CHLORIDE 150; 900 MG/100ML; MG/100ML
INJECTION, SOLUTION INTRAVENOUS CONTINUOUS
Status: DISCONTINUED | OUTPATIENT
Start: 2017-08-07 | End: 2017-08-07

## 2017-08-07 RX ORDER — ONDANSETRON 4 MG/1
4 TABLET, ORALLY DISINTEGRATING ORAL EVERY 8 HOURS PRN
Status: DISCONTINUED | OUTPATIENT
Start: 2017-08-07 | End: 2017-08-09 | Stop reason: HOSPADM

## 2017-08-07 RX ORDER — POLYETHYLENE GLYCOL 3350 17 G/17G
17 POWDER, FOR SOLUTION ORAL DAILY
Status: DISCONTINUED | OUTPATIENT
Start: 2017-08-08 | End: 2017-08-09 | Stop reason: HOSPADM

## 2017-08-07 RX ORDER — MUPIROCIN 20 MG/G
1 OINTMENT TOPICAL 2 TIMES DAILY
Status: COMPLETED | OUTPATIENT
Start: 2017-08-07 | End: 2017-08-09

## 2017-08-07 RX ADMIN — ONDANSETRON 4 MG: 2 INJECTION, SOLUTION INTRAMUSCULAR; INTRAVENOUS at 03:08

## 2017-08-07 RX ADMIN — LIDOCAINE HYDROCHLORIDE 40 MG: 10 INJECTION, SOLUTION INFILTRATION; PERINEURAL at 02:08

## 2017-08-07 RX ADMIN — ACETAMINOPHEN 1000 MG: 10 INJECTION, SOLUTION INTRAVENOUS at 05:08

## 2017-08-07 RX ADMIN — FENTANYL CITRATE 25 MCG: 50 INJECTION, SOLUTION INTRAMUSCULAR; INTRAVENOUS at 03:08

## 2017-08-07 RX ADMIN — SODIUM CHLORIDE, SODIUM LACTATE, POTASSIUM CHLORIDE, AND CALCIUM CHLORIDE: .6; .31; .03; .02 INJECTION, SOLUTION INTRAVENOUS at 12:08

## 2017-08-07 RX ADMIN — HEPARIN SODIUM 5000 UNITS: 5000 INJECTION, SOLUTION INTRAVENOUS; SUBCUTANEOUS at 09:08

## 2017-08-07 RX ADMIN — ROCURONIUM BROMIDE 40 MG: 10 INJECTION, SOLUTION INTRAVENOUS at 02:08

## 2017-08-07 RX ADMIN — SODIUM CHLORIDE, SODIUM LACTATE, POTASSIUM CHLORIDE, AND CALCIUM CHLORIDE: 600; 310; 30; 20 INJECTION, SOLUTION INTRAVENOUS at 02:08

## 2017-08-07 RX ADMIN — FENTANYL CITRATE 50 MCG: 50 INJECTION, SOLUTION INTRAMUSCULAR; INTRAVENOUS at 02:08

## 2017-08-07 RX ADMIN — MUPIROCIN 1 G: 20 OINTMENT TOPICAL at 09:08

## 2017-08-07 RX ADMIN — SODIUM CHLORIDE AND POTASSIUM CHLORIDE: 9; 1.49 INJECTION, SOLUTION INTRAVENOUS at 05:08

## 2017-08-07 RX ADMIN — NEOSTIGMINE METHYLSULFATE 2.5 MG: 1 INJECTION INTRAVENOUS at 03:08

## 2017-08-07 RX ADMIN — CEFAZOLIN 1 G: 1 INJECTION, POWDER, FOR SOLUTION INTRAMUSCULAR; INTRAVENOUS at 02:08

## 2017-08-07 RX ADMIN — PHENYLEPHRINE HYDROCHLORIDE 100 MCG: 10 INJECTION INTRAVENOUS at 03:08

## 2017-08-07 RX ADMIN — ACETAMINOPHEN 650 MG: 325 TABLET ORAL at 05:08

## 2017-08-07 RX ADMIN — DONEPEZIL HYDROCHLORIDE 10 MG: 5 TABLET, FILM COATED ORAL at 09:08

## 2017-08-07 RX ADMIN — PANTOPRAZOLE SODIUM 40 MG: 40 INJECTION, POWDER, FOR SOLUTION INTRAVENOUS at 09:08

## 2017-08-07 RX ADMIN — Medication 15 MG: at 03:08

## 2017-08-07 RX ADMIN — PHENYLEPHRINE HYDROCHLORIDE 50 MCG: 10 INJECTION INTRAVENOUS at 03:08

## 2017-08-07 RX ADMIN — ROBINUL 0.4 MG: 0.2 INJECTION INTRAMUSCULAR; INTRAVENOUS at 03:08

## 2017-08-07 RX ADMIN — MIRTAZAPINE 7.5 MG: 7.5 TABLET ORAL at 09:08

## 2017-08-07 RX ADMIN — ETOMIDATE 12 MG: 2 INJECTION, SOLUTION INTRAVENOUS at 02:08

## 2017-08-07 NOTE — ASSESSMENT & PLAN NOTE
Fall Vs Syncope   CT of head shows no acute findings  TSH normal, Troponin negative, CPK normal , Magnesium normal   2d echo shows normal EF 60-65%, mild aortic regurgitation   US carotids normal   Cardiology consult for surgical clearance   Neurocheck q4   orthostatic qshift   fall precautions   Patient intermediate risk

## 2017-08-07 NOTE — ANESTHESIA PREPROCEDURE EVALUATION
08/07/2017  Herlinda Tamez is a 89 y.o., female.    Anesthesia Evaluation    I have reviewed the Patient Summary Reports.    I have reviewed the Nursing Notes.      Review of Systems  Anesthesia Hx:  No problems with previous Anesthesia  History of prior surgery of interest to airway management or planning: Denies Family Hx of Anesthesia complications.   Denies Personal Hx of Anesthesia complications.   Social:  Non-Smoker, No Alcohol Use    Hematology/Oncology:     Oncology Normal    -- Anemia:   EENT/Dental:EENT/Dental Normal   Cardiovascular:  Cardiovascular Normal  ECG has been reviewed. Echo 08/17  CONCLUSIONS     1 - Concentric remodeling.     2 - No wall motion abnormalities.     3 - Normal left ventricular systolic function (EF 60-65%).     4 - Normal left ventricular diastolic function.     5 - Normal right ventricular systolic function .     6 - The estimated PA systolic pressure is greater than 17 mmHg.     7 - Mild aortic regurgitation.    Pulmonary:  Pulmonary Normal    Renal/:  Renal/ Normal     Hepatic/GI:   GERD    Musculoskeletal:   S/p fall; Xray shows intertrochanteric fracture of the left hip.   Neurological:  Neurology Normal    Endocrine:  Endocrine Normal    Dermatological:  Skin Normal    Psych:   Psychiatric History H/o dementia         Physical Exam  General:  Malnutrition    Airway/Jaw/Neck:  Airway Findings: General Airway Assessment: Adult Mallampati: II          Chest/Lungs:  Chest/Lungs Findings: Clear to auscultation, Normal Respiratory Rate     Heart/Vascular:  Heart Findings: Rate: Normal  Rhythm: Regular Rhythm  Sounds: Normal        Mental Status:  Mental Status Findings:  Confusion       Sodium 136 - 145 mmol/L 137   Potassium 3.5 - 5.1 mmol/L 4.0   Chloride 95 - 110 mmol/L 104   CO2 23 - 29 mmol/L 24   Glucose 70 - 110 mg/dL 95   BUN, Bld 10 - 30 mg/dL 13    Creatinine 0.5 - 1.4 mg/dL 0.6   Calcium 8.7 - 10.5 mg/dL 8.5    Total Protein 6.0 - 8.4 g/dL 6.6     WBC 3.90 - 12.70 K/uL 7.56   RBC 4.00 - 5.40 M/uL 3.50    Hemoglobin 12.0 - 16.0 g/dL 11.9    Hematocrit 37.0 - 48.5 % 35.3    MCV 82 - 98 fL 101    MCH 27.0 - 31.0 pg 34.0    MCHC 32.0 - 36.0 g/dL 33.7   RDW 11.5 - 14.5 % 13.5   Platelets 150 - 350 K/uL 165     Prothrombin Time 9.0 - 12.5 sec 11.1   INR 0.8 - 1.2 1.1         Anesthesia Plan  Type of Anesthesia, risks & benefits discussed:  Anesthesia Type:  general  Patient's Preference:   Intra-op Monitoring Plan: standard ASA monitors  Intra-op Monitoring Plan Comments:   Post Op Pain Control Plan:   Post Op Pain Control Plan Comments:   Induction:   IV  Beta Blocker:  Patient is not currently on a Beta-Blocker (No further documentation required).       Informed Consent: Patient understands risks and agrees with Anesthesia plan.  Questions answered. Anesthesia consent signed with patient.  ASA Score: 3     Day of Surgery Review of History & Physical: I have interviewed and examined the patient. I have reviewed the patient's H&P dated: 8-7-17.           Ready For Surgery From Anesthesia Perspective.

## 2017-08-07 NOTE — BRIEF OP NOTE
[facility] Operative Note    SUMMARY     Surgery Date: 8/7/2017     Surgeon(s) and Role:     * Benny Tello MD - Primary    Assisting Surgeon: None    Pre-op Diagnosis:  Closed left hip fracture, initial encounter [S72.002A]    Post-op Diagnosis:  Post-Op Diagnosis Codes:     * Closed left hip fracture, initial encounter [S72.002A]    Procedure(s) (LRB):  [id number] left hip (Left)    Anesthesia: Gen.    Description of Procedure: TFN fixation of left hip fracture    Description of the findings of the procedure: Intertrochanteric fracture of left hip    Estimated Blood Loss:75 cc    Specimens:   Specimen (12h ago through future)    None

## 2017-08-07 NOTE — HOSPITAL COURSE
89 year old female admitted for left hip fracture. ED evaluation which showed unremarkable lab studies. XR left hip shows there is a slightly comminuted fracture through the intertrochanteric region of the left hip with slight valgus angulation. No dislocation. CXR normal. Ortho consult. Cariology consult for surgical clearance. Troponin negative. Magnesium and TSH normal. CT of head was limited examination secondary to patient motion. Excluding motion artifact, there is no evidence of a calvarial fracture or intracranial hemorrhage. There is no evidence of an acute ischemic event. Shashank Carotid US normal. 2D Echo shows normal EF 60-65%, Mild aortic regurgitation. EKG Sinus rhythm with Premature atrial complexes. Patient Intermediate risk. Patient underwent Left hip ORIF 8/7/2017, pt. Tolerated procedure well. IS. H/H stable. Pain controlled. Lovenox 40mg for DVT prophylaxis during hospitalization. PT/OT consult. Social work consult for discharge planning. Patient discharged per Dr. Tello (ortho). Patient's dressing was changed this morning with sterile 4x4s and microfoam tape. Patient will be discharge back to Nursing home with PT/OT. She will be WBAT on LLE with a roller walker. Patient to follow-up with Orthopedics in 2 weeks to have her staples removed. Due to patient age and body habitus Dr. Tello (Ortho) recommends ASA for DVT prophylaxis due to increased risk of bleeding and falls. Patient seen and examined on date of discharge and found suitable for discharge.

## 2017-08-07 NOTE — ANESTHESIA POSTPROCEDURE EVALUATION
Anesthesia Post Evaluation    Patient: Herlinda Tamez    Procedure(s) Performed: Procedure(s) (LRB):  [id number] left hip (Left)    Final Anesthesia Type: general  Patient location during evaluation: PACU  Patient participation: Yes- Able to Participate  Level of consciousness: awake and alert and oriented  Post-procedure vital signs: reviewed and stable  Pain management: adequate  Airway patency: patent  PONV status at discharge: No PONV  Anesthetic complications: no      Cardiovascular status: hemodynamically stable  Respiratory status: spontaneous ventilation  Hydration status: euvolemic  Follow-up not needed.        Visit Vitals  BP (!) 159/74 (BP Location: Left arm, Patient Position: Lying, BP Method: Automatic)   Pulse 95   Temp 36.9 °C (98.4 °F) (Oral)   Resp 20   Ht 5' (1.524 m)   Wt 35.4 kg (78 lb)   SpO2 97%   Breastfeeding? No   BMI 15.23 kg/m²       Pain/Aakash Score: Pain Assessment Performed: Yes (8/7/2017  3:47 PM)  Presence of Pain: non-verbal indicators absent (8/7/2017  4:00 PM)  Pain Rating Prior to Med Admin: 0 (patient asleep) (8/7/2017  5:27 PM)  Pain Rating Post Med Admin: 0 (8/7/2017  6:27 PM)  Aakash Score: 7 (8/7/2017  4:00 PM)

## 2017-08-07 NOTE — PROGRESS NOTES
[facility] Hospital Medicine  Progress Note    Patient Name: Herlinda Tamez  MRN: [id number]  Patient Class: IP- Inpatient   Admission Date: 8/6/2017  Length of Stay: 1 days  Attending Physician: Dayanara Cruz MD  Primary Care Provider: Mckay Hurtado MD        Subjective:     Principal Problem:Closed left hip fracture    HPI:  Herlinda Tamez is a 89 y.o. female patient who has PMHx of dementia presents to the Emergency Department for L hip pain which onset gradually PTA. [facility] reports pt falling at nursing home and being found on the floor. Fall was unwitnessed. [facility] reports noticeable L leg shortening. [facility] reports pt being given Tylenol PO at nursing home and Fentanyl IV 75 mg en route.  No associated sxs. Patient denies any HA, numbness, weakness, dizziness, back pain, neck pain, knee pain, abd pain, CP, SOB, and all other sxs at this time. Pt's family reports pt normally is able to walk with a walker. ED evaluation which showed unremarkable lab studies. XR left hip shows there is a slightly comminuted fracture through the intertrochanteric region of the left hip with slight valgus angulation. No dislocation. CXR normal. Ortho consult.     Hospital Course:  89 year old female admitted for left hip fracture. ED evaluation which showed unremarkable lab studies. XR left hip shows there is a slightly comminuted fracture through the intertrochanteric region of the left hip with slight valgus angulation. No dislocation. CXR normal. Ortho consult. Cariology consult for surgical clearance. Troponin negative. Magnesium and TSH normal. CT of head was limited examination secondary to patient motion. Excluding motion artifact, there is no evidence of a calvarial fracture or intracranial hemorrhage. There is no evidence of an acute ischemic event. Shashank Carotid US normal. 2D Echo shows normal EF 60-65%, Mild aortic regurgitation. EKG Sinus rhythm with Premature atrial complexes. Patient Intermediate risk.      Interval History: No events overnight. Pain controlled. Plan for surgery today.     Review of Systems   Unable to perform ROS: Dementia     Objective:     Vital Signs (Most Recent):  Temp: 97 °F (36.1 °C) (08/07/17 0735)  Pulse: 68 (08/07/17 0735)  Resp: 18 (08/07/17 0735)  BP: (!) 119/56 (08/07/17 0735)  SpO2: 95 % (08/07/17 0735) Vital Signs (24h Range):  Temp:  [97 °F (36.1 °C)-98.7 °F (37.1 °C)] 97 °F (36.1 °C)  Pulse:  [68-89] 68  Resp:  [16-82] 18  SpO2:  [95 %-99 %] 95 %  BP: (119-152)/(56-83) 119/56     Weight: 35.4 kg (78 lb)  Body mass index is 15.23 kg/m².    Intake/Output Summary (Last 24 hours) at 08/07/17 1121  Last data filed at 08/07/17 0700   Gross per 24 hour   Intake           743.33 ml   Output              800 ml   Net           -56.67 ml      Physical Exam   Constitutional: She appears frail and thin.   Emaciated    HENT:   Head: Normocephalic and atraumatic.   Eyes: Conjunctivae and EOM are normal. Pupils are equal, round, and reactive to light.   Neck: Normal range of motion. Neck supple.   Cardiovascular: Normal rate, regular rhythm, normal heart sounds and intact distal pulses.    Pulmonary/Chest: Effort normal and breath sounds normal.   Abdominal: Soft. Bowel sounds are normal.   Musculoskeletal:   L leg externally rotated and shortened. Pain w/ ROM. 2+ Pedal pulse    Neurological: She is alert. She has normal reflexes. She is disoriented.   Skin: Skin is warm and dry.   Psychiatric: She has a normal mood and affect. Her behavior is normal. Judgment and thought content normal.   Nursing note and vitals reviewed.      Significant Labs:   BMP:   Recent Labs  Lab 08/06/17  1207 08/06/17  1846   GLU 95  --      --    K 4.0  --      --    CO2 24  --    BUN 13  --    CREATININE 0.6  --    CALCIUM 8.5*  --    MG  --  1.7     CBC:   Recent Labs  Lab 08/06/17  1207   WBC 7.56   HGB 11.9*   HCT 35.3*        CMP:   Recent Labs  Lab 08/06/17  1207      K 4.0       CO2 24   GLU 95   BUN 13   CREATININE 0.6   CALCIUM 8.5*   PROT 6.6   ALBUMIN 3.2*   BILITOT 0.8   ALKPHOS 64   AST 29   ALT 26   ANIONGAP 9   EGFRNONAA >60     Magnesium:   Recent Labs  Lab 08/06/17  1846   MG 1.7     Troponin:   Recent Labs  Lab 08/06/17  1846   TROPONINI 0.008     TSH:   Recent Labs  Lab 08/06/17  1846   TSH 1.249     All pertinent labs within the past 24 hours have been reviewed.    Significant Imaging:   Imaging Results          CT Head Without Contrast (Final result)  Result time 08/06/17 20:33:58    Final result by PEEWEE Tapia Sr., MD (08/06/17 20:33:58)                 Impression:      1.  This is a limited examination secondary to patient motion.  Excluding motion artifact, there is no evidence of a calvarial fracture or intracranial hemorrhage.   2.  There is no evidence of an acute ischemic event.        All CT scans at this facility use dose modulation, iterative reconstruction, and/or weight base dosing when appropriate to reduce radiation dose when appropriate to reduce radiation dose to as low as reasonably achievable.      Electronically signed by: PEEWEE TAPIA MD  Date:     08/06/17  Time:    20:33              Narrative:    CT of Head without IV contrast    History:     Headache status post fall    Technique: Standard brain CT protocol without IV contrast was performed.     Finding: This is a limited examination secondary to patient motion.  Excluding motion artifact, there is no evidence of a calvarial fracture or intracranial hemorrhage.  There are moderate chronic appearing ischemic changes in the deep white matter of both cerebral hemispheres.  There is no evidence of an acute ischemic event.  The visualized portion of the paranasal sinuses is clear.                             US Carotid Bilateral (Final result)  Result time 08/06/17 19:32:08    Final result by PEEWEE Tapia Sr., MD (08/06/17 19:32:08)                 Impression:         Normal study    Validated  velocity measurements with angiographic measurements, velocity criteria are extrapolated from diameter data as defined by the Society of Radiologists in Ultrasound Consensus Conference Radiology 2003; 229; 340-346      Electronically signed by: PEEWEE MONTOYA MD  Date:     08/06/17  Time:    19:32              Narrative:    Ultrasound examination of the carotid arteries with color flow and spectral Doppler imaging    History:     Neck pain status post fall; Possible carotid artery stenosis    Technique: Multiple static ultrasound images are submitted for interpretation.    Finding: The following pertains to the right side of the neck. The common carotid artery has a normal arterial waveform with peak systolic velocity of 81 cm/sec and a diastolic velocity of 10 cm/sec. The internal carotid artery has a normal arterial waveform with a peak systolic velocity of 79 cm/sec and a diastolic velocity of 16 cm/sec. The external carotid artery has a peak systolic velocity of 55 cm/sec. The vertebral artery has normal antegrade flow.    The following pertains to the left side of the neck. The common carotid artery has a normal arterial waveform with peak systolic velocity of 90 cm/sec and a diastolic velocity of 15 cm/sec. The internal carotid artery has a normal arterial waveform with a peak systolic velocity of 79 cm/sec and a diastolic velocity of 14 cm/sec. The external carotid artery has a peak systolic velocity of 88 cm/sec. The vertebral artery has normal antegrade flow.                             X-Ray Chest AP Portable (Final result)  Result time 08/06/17 12:17:05    Final result by Alec Carey MD (08/06/17 12:17:05)                 Impression:     Normal chest      Electronically signed by: ALEC CAREY MD  Date:     08/06/17  Time:    12:17              Narrative:    Portable chest    Clinical indication blunt chest trauma and preop    Findings: The heart and lungs are normal.  There are no infiltrates.                              X-Ray Hip 2 View Left (Final result)  Result time 08/06/17 12:16:40    Final result by Alec Carey MD (08/06/17 12:16:40)                 Impression:     Slightly angled intertrochanteric fracture of the left hip      Electronically signed by: ALEC CAREY MD  Date:     08/06/17  Time:    12:16              Narrative:    Left hip 3 views    Clinical indication: Hip pain and trauma    Findings: There is a slightly comminuted fracture through the intertrochanteric region of the left hip with slight valgus angulation.  No dislocation.                            Assessment/Plan:      Fall from ground level    Fall Vs Syncope   CT of head shows no acute findings  TSH normal, Troponin negative, CPK normal , Magnesium normal   2d echo shows normal EF 60-65%, mild aortic regurgitation   US carotids normal   Cardiology consult for surgical clearance   Neurocheck q4   orthostatic qshift   fall precautions   Patient intermediate risk               Dementia    Resume home meds   Fall precautions            * Closed left hip fracture s/p ground level fall    -Ortho consult   -XR left hip Slightly angled intertrochanteric fracture of the left hip  -PRN analgesics   -LLE neurovascular and circulation checks q shift   -Possible surgery in the AM  -Fall precautions                 VTE Risk Mitigation         Ordered     Medium Risk of VTE  Once      08/06/17 1532     Place TANIA hose  Until discontinued      08/06/17 1532     Place sequential compression device  Until discontinued      08/06/17 1532     Place sequential compression device  Until discontinued      08/06/17 1531              Jenny Norman NP  Department of Hospital Medicine   [facility]

## 2017-08-07 NOTE — PLAN OF CARE
Pt lying in bed sleeping. No signs/symptoms of pain or distress. Family updated upon pt arrival to recovery room.

## 2017-08-07 NOTE — ANESTHESIA RELEASE NOTE
Anesthesia Release from PACU Note    Patient: Herlinda Tamez    Procedure(s) Performed: Procedure(s) (LRB):  [id number] left hip (Left)    Anesthesia type: general    Post pain: Adequate analgesia    Post assessment: no apparent anesthetic complications, tolerated procedure well and no evidence of recall    Last Vitals:   Visit Vitals  BP (!) 159/74 (BP Location: Left arm, Patient Position: Lying, BP Method: Automatic)   Pulse 95   Temp 36.9 °C (98.4 °F) (Oral)   Resp 20   Ht 5' (1.524 m)   Wt 35.4 kg (78 lb)   SpO2 97%   Breastfeeding? No   BMI 15.23 kg/m²       Post vital signs: stable    Level of consciousness: awake, alert  and oriented    Nausea/Vomiting: no nausea/no vomiting    Complications: none    Airway Patency: patent    Respiratory: unassisted, spontaneous ventilation, room air    Cardiovascular: stable and blood pressure at baseline    Hydration: euvolemic

## 2017-08-07 NOTE — PLAN OF CARE
Problem: Patient Care Overview  Goal: Plan of Care Review  Outcome: Ongoing (interventions implemented as appropriate)  Pt remained free of injury during shift, stable condition, pain adequately controlled, no acute distress, receiving IV fluids, remained NPO for procedure, family at bedside and bed alarm set, and will continue to monitor. 24hr chart review performed.

## 2017-08-07 NOTE — INTERVAL H&P NOTE
The patient has been examined and the H&P has been reviewed:    I concur with the findings and no changes have occurred since H&P was written.    Anesthesia/Surgery risks, benefits and alternative options discussed and understood by patient/family.          Active Hospital Problems    Diagnosis  POA    *Closed left hip fracture s/p ground level fall [S72.002A]  Yes    Dementia [F03.90]  Yes     Chronic    Fall from ground level [W18.30XA]  Yes      Resolved Hospital Problems    Diagnosis Date Resolved POA   No resolved problems to display.

## 2017-08-07 NOTE — TRANSFER OF CARE
Anesthesia Transfer of Care Note    Patient: Herlinda Tamez    Procedure(s) Performed: Procedure(s) (LRB):  [id number] left hip (Left)    Patient location: PACU    Anesthesia Type: general    Transport from OR: Transported from OR on room air with adequate spontaneous ventilation    Post pain: adequate analgesia    Post assessment: no apparent anesthetic complications    Post vital signs: stable    Level of consciousness: responds to stimulation    Complications: none    Transfer of care protocol was followed      Last vitals:   Visit Vitals  /64   Pulse 88   Temp 36.8 °C (98.2 °F) (Temporal)   Resp 12   Ht 5' (1.524 m)   Wt 35.4 kg (78 lb)   SpO2 100%   Breastfeeding? No   BMI 15.23 kg/m²

## 2017-08-07 NOTE — SUBJECTIVE & OBJECTIVE
Interval History: No events overnight. Pain controlled. Plan for surgery today.     Review of Systems   Unable to perform ROS: Dementia     Objective:     Vital Signs (Most Recent):  Temp: 97 °F (36.1 °C) (08/07/17 0735)  Pulse: 68 (08/07/17 0735)  Resp: 18 (08/07/17 0735)  BP: (!) 119/56 (08/07/17 0735)  SpO2: 95 % (08/07/17 0735) Vital Signs (24h Range):  Temp:  [97 °F (36.1 °C)-98.7 °F (37.1 °C)] 97 °F (36.1 °C)  Pulse:  [68-89] 68  Resp:  [16-82] 18  SpO2:  [95 %-99 %] 95 %  BP: (119-152)/(56-83) 119/56     Weight: 35.4 kg (78 lb)  Body mass index is 15.23 kg/m².    Intake/Output Summary (Last 24 hours) at 08/07/17 1121  Last data filed at 08/07/17 0700   Gross per 24 hour   Intake           743.33 ml   Output              800 ml   Net           -56.67 ml      Physical Exam   Constitutional: She appears well-developed and well-nourished.   Emaciated    HENT:   Head: Normocephalic and atraumatic.   Eyes: Conjunctivae and EOM are normal. Pupils are equal, round, and reactive to light.   Neck: Normal range of motion. Neck supple.   Cardiovascular: Normal rate, regular rhythm, normal heart sounds and intact distal pulses.    Pulmonary/Chest: Effort normal and breath sounds normal.   Abdominal: Soft. Bowel sounds are normal.   Musculoskeletal:   L leg externally rotated and shortened. Pain w/ ROM. 2+ Pedal pulse    Neurological: She is alert. She has normal reflexes. She is disoriented.   Skin: Skin is warm and dry.   Psychiatric: She has a normal mood and affect. Her behavior is normal. Judgment and thought content normal.   Nursing note and vitals reviewed.      Significant Labs:   BMP:   Recent Labs  Lab 08/06/17  1207 08/06/17  1846   GLU 95  --      --    K 4.0  --      --    CO2 24  --    BUN 13  --    CREATININE 0.6  --    CALCIUM 8.5*  --    MG  --  1.7     CBC:   Recent Labs  Lab 08/06/17  1207   WBC 7.56   HGB 11.9*   HCT 35.3*        CMP:   Recent Labs  Lab 08/06/17  1207      K  4.0      CO2 24   GLU 95   BUN 13   CREATININE 0.6   CALCIUM 8.5*   PROT 6.6   ALBUMIN 3.2*   BILITOT 0.8   ALKPHOS 64   AST 29   ALT 26   ANIONGAP 9   EGFRNONAA >60     Magnesium:   Recent Labs  Lab 08/06/17  1846   MG 1.7     Troponin:   Recent Labs  Lab 08/06/17  1846   TROPONINI 0.008     TSH:   Recent Labs  Lab 08/06/17  1846   TSH 1.249     All pertinent labs within the past 24 hours have been reviewed.    Significant Imaging:   Imaging Results          CT Head Without Contrast (Final result)  Result time 08/06/17 20:33:58    Final result by PEEWEE Tapia Sr., MD (08/06/17 20:33:58)                 Impression:      1.  This is a limited examination secondary to patient motion.  Excluding motion artifact, there is no evidence of a calvarial fracture or intracranial hemorrhage.   2.  There is no evidence of an acute ischemic event.        All CT scans at this facility use dose modulation, iterative reconstruction, and/or weight base dosing when appropriate to reduce radiation dose when appropriate to reduce radiation dose to as low as reasonably achievable.      Electronically signed by: PEEWEE TAPIA MD  Date:     08/06/17  Time:    20:33              Narrative:    CT of Head without IV contrast    History:     Headache status post fall    Technique: Standard brain CT protocol without IV contrast was performed.     Finding: This is a limited examination secondary to patient motion.  Excluding motion artifact, there is no evidence of a calvarial fracture or intracranial hemorrhage.  There are moderate chronic appearing ischemic changes in the deep white matter of both cerebral hemispheres.  There is no evidence of an acute ischemic event.  The visualized portion of the paranasal sinuses is clear.                             US Carotid Bilateral (Final result)  Result time 08/06/17 19:32:08    Final result by PEEWEE Tapia Sr., MD (08/06/17 19:32:08)                 Impression:         Normal  study    Validated velocity measurements with angiographic measurements, velocity criteria are extrapolated from diameter data as defined by the Society of Radiologists in Ultrasound Consensus Conference Radiology 2003; 229; 340-346      Electronically signed by: PEEWEE MONTOYA MD  Date:     08/06/17  Time:    19:32              Narrative:    Ultrasound examination of the carotid arteries with color flow and spectral Doppler imaging    History:     Neck pain status post fall; Possible carotid artery stenosis    Technique: Multiple static ultrasound images are submitted for interpretation.    Finding: The following pertains to the right side of the neck. The common carotid artery has a normal arterial waveform with peak systolic velocity of 81 cm/sec and a diastolic velocity of 10 cm/sec. The internal carotid artery has a normal arterial waveform with a peak systolic velocity of 79 cm/sec and a diastolic velocity of 16 cm/sec. The external carotid artery has a peak systolic velocity of 55 cm/sec. The vertebral artery has normal antegrade flow.    The following pertains to the left side of the neck. The common carotid artery has a normal arterial waveform with peak systolic velocity of 90 cm/sec and a diastolic velocity of 15 cm/sec. The internal carotid artery has a normal arterial waveform with a peak systolic velocity of 79 cm/sec and a diastolic velocity of 14 cm/sec. The external carotid artery has a peak systolic velocity of 88 cm/sec. The vertebral artery has normal antegrade flow.                             X-Ray Chest AP Portable (Final result)  Result time 08/06/17 12:17:05    Final result by Alec Carey MD (08/06/17 12:17:05)                 Impression:     Normal chest      Electronically signed by: ALEC CAREY MD  Date:     08/06/17  Time:    12:17              Narrative:    Portable chest    Clinical indication blunt chest trauma and preop    Findings: The heart and lungs are normal.  There are no  infiltrates.                             X-Ray Hip 2 View Left (Final result)  Result time 08/06/17 12:16:40    Final result by Alec Carey MD (08/06/17 12:16:40)                 Impression:     Slightly angled intertrochanteric fracture of the left hip      Electronically signed by: ALEC CAREY MD  Date:     08/06/17  Time:    12:16              Narrative:    Left hip 3 views    Clinical indication: Hip pain and trauma    Findings: There is a slightly comminuted fracture through the intertrochanteric region of the left hip with slight valgus angulation.  No dislocation.

## 2017-08-08 PROBLEM — Z87.81 S/P ORIF (OPEN REDUCTION INTERNAL FIXATION) FRACTURE: Status: ACTIVE | Noted: 2017-08-06

## 2017-08-08 PROBLEM — Z98.890 S/P ORIF (OPEN REDUCTION INTERNAL FIXATION) FRACTURE: Status: ACTIVE | Noted: 2017-08-06

## 2017-08-08 PROBLEM — D62 ACUTE BLOOD LOSS ANEMIA: Status: ACTIVE | Noted: 2017-08-08

## 2017-08-08 LAB
ANION GAP SERPL CALC-SCNC: 8 MMOL/L
BUN SERPL-MCNC: 14 MG/DL
CALCIUM SERPL-MCNC: 7.9 MG/DL
CHLORIDE SERPL-SCNC: 105 MMOL/L
CO2 SERPL-SCNC: 23 MMOL/L
CREAT SERPL-MCNC: 0.6 MG/DL
EST. GFR  (AFRICAN AMERICAN): >60 ML/MIN/1.73 M^2
EST. GFR  (NON AFRICAN AMERICAN): >60 ML/MIN/1.73 M^2
GLUCOSE SERPL-MCNC: 75 MG/DL
HCT VFR BLD AUTO: 30 %
HGB BLD-MCNC: 10.1 G/DL
POTASSIUM SERPL-SCNC: 3.9 MMOL/L
SODIUM SERPL-SCNC: 136 MMOL/L

## 2017-08-08 PROCEDURE — G8987 SELF CARE CURRENT STATUS: HCPCS | Mod: CL

## 2017-08-08 PROCEDURE — 25000003 PHARM REV CODE 250: Performed by: NURSE PRACTITIONER

## 2017-08-08 PROCEDURE — 63600175 PHARM REV CODE 636 W HCPCS: Performed by: INTERNAL MEDICINE

## 2017-08-08 PROCEDURE — G8978 MOBILITY CURRENT STATUS: HCPCS | Mod: CM

## 2017-08-08 PROCEDURE — 63600175 PHARM REV CODE 636 W HCPCS: Performed by: NURSE PRACTITIONER

## 2017-08-08 PROCEDURE — 36415 COLL VENOUS BLD VENIPUNCTURE: CPT

## 2017-08-08 PROCEDURE — 97167 OT EVAL HIGH COMPLEX 60 MIN: CPT

## 2017-08-08 PROCEDURE — G8979 MOBILITY GOAL STATUS: HCPCS | Mod: CL

## 2017-08-08 PROCEDURE — C9113 INJ PANTOPRAZOLE SODIUM, VIA: HCPCS | Performed by: EMERGENCY MEDICINE

## 2017-08-08 PROCEDURE — 25000003 PHARM REV CODE 250: Performed by: ORTHOPAEDIC SURGERY

## 2017-08-08 PROCEDURE — 21400001 HC TELEMETRY ROOM

## 2017-08-08 PROCEDURE — 63600175 PHARM REV CODE 636 W HCPCS: Performed by: EMERGENCY MEDICINE

## 2017-08-08 PROCEDURE — 96372 THER/PROPH/DIAG INJ SC/IM: CPT

## 2017-08-08 PROCEDURE — 99900035 HC TECH TIME PER 15 MIN (STAT)

## 2017-08-08 PROCEDURE — 11000001 HC ACUTE MED/SURG PRIVATE ROOM

## 2017-08-08 PROCEDURE — 80048 BASIC METABOLIC PNL TOTAL CA: CPT

## 2017-08-08 PROCEDURE — 80048 BASIC METABOLIC PNL TOTAL CA: CPT | Mod: 91

## 2017-08-08 PROCEDURE — 63600175 PHARM REV CODE 636 W HCPCS: Performed by: ORTHOPAEDIC SURGERY

## 2017-08-08 PROCEDURE — 97116 GAIT TRAINING THERAPY: CPT

## 2017-08-08 PROCEDURE — 85014 HEMATOCRIT: CPT

## 2017-08-08 PROCEDURE — 97535 SELF CARE MNGMENT TRAINING: CPT

## 2017-08-08 PROCEDURE — 97162 PT EVAL MOD COMPLEX 30 MIN: CPT

## 2017-08-08 PROCEDURE — G8988 SELF CARE GOAL STATUS: HCPCS | Mod: CK

## 2017-08-08 PROCEDURE — 85018 HEMOGLOBIN: CPT

## 2017-08-08 RX ORDER — PANTOPRAZOLE SODIUM 40 MG/1
40 TABLET, DELAYED RELEASE ORAL DAILY
Status: DISCONTINUED | OUTPATIENT
Start: 2017-08-09 | End: 2017-08-09 | Stop reason: HOSPADM

## 2017-08-08 RX ORDER — ENOXAPARIN SODIUM 100 MG/ML
40 INJECTION SUBCUTANEOUS EVERY 24 HOURS
Status: DISCONTINUED | OUTPATIENT
Start: 2017-08-08 | End: 2017-08-09 | Stop reason: HOSPADM

## 2017-08-08 RX ADMIN — ACETAMINOPHEN 650 MG: 325 TABLET ORAL at 12:08

## 2017-08-08 RX ADMIN — CEFAZOLIN SODIUM 1 G: 1 SOLUTION INTRAVENOUS at 12:08

## 2017-08-08 RX ADMIN — ACETAMINOPHEN 650 MG: 325 TABLET ORAL at 06:08

## 2017-08-08 RX ADMIN — CEFAZOLIN SODIUM 1 G: 1 SOLUTION INTRAVENOUS at 10:08

## 2017-08-08 RX ADMIN — HEPARIN SODIUM 5000 UNITS: 5000 INJECTION, SOLUTION INTRAVENOUS; SUBCUTANEOUS at 06:08

## 2017-08-08 RX ADMIN — MIRTAZAPINE 7.5 MG: 7.5 TABLET ORAL at 10:08

## 2017-08-08 RX ADMIN — ENOXAPARIN SODIUM 40 MG: 100 INJECTION SUBCUTANEOUS at 05:08

## 2017-08-08 RX ADMIN — ACETAMINOPHEN 650 MG: 325 TABLET ORAL at 05:08

## 2017-08-08 RX ADMIN — MUPIROCIN 1 G: 20 OINTMENT TOPICAL at 10:08

## 2017-08-08 RX ADMIN — POLYETHYLENE GLYCOL 3350 17 G: 17 POWDER, FOR SOLUTION ORAL at 10:08

## 2017-08-08 RX ADMIN — DONEPEZIL HYDROCHLORIDE 10 MG: 5 TABLET, FILM COATED ORAL at 10:08

## 2017-08-08 RX ADMIN — PANTOPRAZOLE SODIUM 40 MG: 40 INJECTION, POWDER, FOR SOLUTION INTRAVENOUS at 10:08

## 2017-08-08 RX ADMIN — ACETAMINOPHEN 650 MG: 325 TABLET ORAL at 11:08

## 2017-08-08 NOTE — CONSULTS
Met with patient and her family to discuss discharge options. Patient's niece stated that the patient lives at [facility] and that she will be able to receive therapy once she returns to [facility]. Attempted to contact Elly Figueroa ( [phone] ) owner of [facility] left vm.

## 2017-08-08 NOTE — ASSESSMENT & PLAN NOTE
-Ortho consult   -XR left hip Slightly angled intertrochanteric fracture of the left hip  -PRN analgesics   -LLE neurovascular and circulation checks q shift   -Patient underwent Left hip ORIF 8/7/2017  -Pt. Tolerated procedure well   -Left hip dressing C/D/I, Pain controlled   -Lovenox 40 mg for DVT prophylaxis   -Fall precautions   -PT/OT consult  -Social work consult for discharge planning

## 2017-08-08 NOTE — CONSULTS
08/08/17 0820   Pain/Comfort Assessments   Pain Assessment Performed Yes       Number Scale   Presence of Pain non-verbal indicators absent   Skin   Skin WDL ex   Skin Color/Characteristics bruised (ecchymotic);redness blanchable   Skin Temperature warm   Skin Moisture dry   Skin Elasticity slow return to original state   Skin Integrity incision(s)   Musa Risk Assessment   Sensory Perception 3-->slightly limited   Moisture 3-->occasionally moist   Activity 2-->chairfast   Mobility 2-->very limited   Nutrition 2-->probably inadequate   Friction and Shear 1-->problem   Musa Score 13   Skin Interventions   Pressure Reduction Devices pressure-redistributing mattress utilized;positioning supports utilized   Pressure Reduction Techniques frequent weight shift encouraged;heels elevated off bed;positioned off wounds;weight shift assistance provided   Skin Protection adhesive use limited;incontinence pads utilized;skin sealant/moisture barrier applied   Positioning   Body Position side-lying, right   Head of Bed (HOB) HOB at 20-30 degrees   Positioning/Transfer Devices pillows;wedge;in use       Consulted on this 89 y/o F patient s/p Left hip fracture from fall with IM lauryn insertion on 8/7. Patient has PMH significant for arthritis and dementia. Patient weighs 78 lbs, and her caregiver at bedside states she doesn't want to eat.  Will consult Dietary.  Bilateral heels intact with no redness or breakdown noted.  Patient turned to right side with assistance.  Blanchable redness noted to coccyx and intergluteal folds.  Painted with cavilon. Patient is incontinent of stool and urine, recommend critic aid clear barrier prn incontinence episodes. Blanchable redness also noted to protruding Thoracic spine.  Painted with cavilon and Mepilex foam dressing applied to site.  Recommend continued pressure offloading, turning with foam wedge q2h, and elevating heels on pillows. Please see below for wound care recommendations and  "picture:    Skin Care Precautions / Pressure Injury Prevention:  1. Follow "Guidelines for Prevention of Pressure Ulcers in At Risk Patients"  These guidelines can be found on the [facility] Intr[employer]et by searching "Wound Care / Ostomy Resources"  2. Document wound assessment in [facility] using guidelines in Vlad's "Assessment : Wound" procedure  3. Limit the amount of linen/underpad between patient and mattress surface to ONE fitted sheet and ONE covidien underpad - NO draw sheet/briefs.  4. Obtain Easi Cleans Foam Wipes for providing lucero care - avoid the use of wash cloths to areas affected by IAD.  5. Apply Clear Barrier Ointment to perineal / perirectal areas in a thin even layer to clean dry skin BID and after each episode of pericare  6. Apply sween 24 moisturizer cream to all dry skin after daily bath and prn  7. Obtain foam wedge from materials management to assist with maintaining proper position changes at least q 2hours and document actual position in EPIC q 2hours  8. Elevate heels off mattress on 2 separate pillows placed lengthwise under each leg supporting the leg from knee to ankle.  Document in EPIC flow sheet every 2 hours.  9. .Do NOT elevate HOB greater than 30 degrees unless contraindicated.  10. Remove SCD/Plexi Pulses/TANIA's every 12 hours for 30 minutes and assess skin underneath these devices for breakdown              Blanchable redness to coccyx/intergluteal folds:        "

## 2017-08-08 NOTE — ASSESSMENT & PLAN NOTE
The patient is doing well status post trochanteric femoral nail fixation of her left hip fracture.  Her dressings are dry and intact.  She was needed to work on transfers, bed to chair status, and a relation training weightbearing as tolerated left lower extremity utilizing a roller walker.  She should be ready for transfer back to her nursing home with physical therapy at the nursing home tomorrow if she remains medically stable.

## 2017-08-08 NOTE — PROGRESS NOTES
[facility]  Orthopedics  Progress Note    Patient Name: Herlinda Tamez  MRN: [id number]  Admission Date: 8/6/2017  Hospital Length of Stay: 2 days  Attending Provider: Dayanara Cruz MD  Primary Care Provider: Mckay Hurtado MD  Follow-up For: Procedure(s) (LRB):  [id number] left hip (Left)    Post-Operative Day: 1 Day Post-Op  Subjective:     Principal Problem:S/P TFN fixation of left intertrochanteric hip fracture    Principal Orthopedic Problem: Intertrochanteric fracture of left hip     Interval History: The patient had TFN fixation of her left hip fracture yesterday.  She is resting comfortably in bed.  Her hemoglobin and hematocrit are stable.  She has an expected and anticipated postoperative acute anemia.    Review of patient's allergies indicates:  No Known Allergies    Current Facility-Administered Medications   Medication    acetaminophen tablet 650 mg    bisacodyl suppository 10 mg    ceFAZolin (ANCEF) 1 gram in dextrose 5 % 50 mL IVPB (premix)    donepezil tablet 10 mg    enoxaparin injection 40 mg    hydromorphone (PF) injection 0.2 mg    lactated ringers infusion    mirtazapine tablet 7.5 mg    mupirocin 2 % ointment 1 g    ondansetron disintegrating tablet 4 mg    ondansetron injection 4 mg    oxycodone immediate release tablet 5 mg    [START ON 8/9/2017] pantoprazole EC tablet 40 mg    polyethylene glycol packet 17 g    sodium chloride 0.9% flush 3 mL     Objective:     Vital Signs (Most Recent):  Temp: 97.7 °F (36.5 °C) (08/08/17 0820)  Pulse: 97 (08/08/17 0820)  Resp: 18 (08/08/17 0820)  BP: (!) 122/57 (08/08/17 0820)  SpO2: 97 % (08/08/17 0820) Vital Signs (24h Range):  Temp:  [97.7 °F (36.5 °C)-98.6 °F (37 °C)] 97.7 °F (36.5 °C)  Pulse:  [] 97  Resp:  [10-21] 18  SpO2:  [96 %-100 %] 97 %  BP: (113-180)/() 122/57     Weight: 35.4 kg (78 lb)  Height: 5' (152.4 cm)  Body mass index is 15.23 kg/m².      Intake/Output Summary (Last 24  hours) at 08/08/17 1513  Last data filed at 08/08/17 1339   Gross per 24 hour   Intake             3245 ml   Output              370 ml   Net             2875 ml       Ortho/SPM Exam  The patient's dressing is clean and dry and intact.  Neurovascular status is intact.  Significant Labs: All pertinent labs within the past 24 hours have been reviewed.    Significant Imaging: I have reviewed and interpreted all pertinent imaging results/findings.    Assessment/Plan:     Acute blood loss anemia    We'll recheck an H&H tomorrow morning.        * S/P ORIF (open reduction internal fixation) fracture    The patient is doing well status post trochanteric femoral nail fixation of her left hip fracture.  Her dressings are dry and intact.  She was needed to work on transfers, bed to chair status, and a relation training weightbearing as tolerated left lower extremity utilizing a roller walker.  She should be ready for transfer back to her nursing home with physical therapy at the nursing home tomorrow if she remains medically stable.              Benny Tello MD  Orthopedics  [facility]

## 2017-08-08 NOTE — PT/OT/SLP EVAL
Occupational Therapy  Evaluation    Herlinda Tamez   MRN: [id number]   Admitting Diagnosis: S/P ORIF (open reduction internal fixation) fracture    OT Date of Treatment: 08/08/17   OT Start Time: 1358  OT Stop Time: 1423  OT Total Time (min): 25 min    Billable Minutes:  Evaluation 10 MINUTES  Therapeutic Activity 15 MINUTES    Diagnosis: S/P ORIF (open reduction internal fixation) fracture   Impaired adl's, impaired functional mobility, impaired t/f's, decrease b ue strength/endurance    Past Medical History:   Diagnosis Date    Dementia     Osteoporosis       Past Surgical History:   Procedure Laterality Date    fracture repair      fracture tailbone reparied       Referring physician: Dr Tello     Date referred to OT: 8-7-17    General Precautions: Standard, fall  Orthopedic Precautions: LLE weight bearing as tolerated  Braces:            Patient History:  Living Environment  Living Arrangements: residential facility  Home Layout: Able to live on 1st floor  Living Environment Comment: CHART REVIEW INDICATED PT LIVES IN RESIDENTIAL FACILITY. PT POOR HISTORIAN REGARDING PLOF  Equipment Currently Used at Home: walker, rolling    Prior level of function:      Driving License: No  Occupation: Retired     Dominant hand: right    Subjective:  Communicated with nurse SLATER and [facility] chart review prior to session.    Chief Complaint: Impaired adl's, impaired functional mobility, impaired t/f's    Patient/Family stated goals:     Pain/Comfort  Pain Rating 1: 6/10 (FACIAL GRIMANCE)  Location - Side 1: Left    Objective:  Patient found with: peripheral IV    Cognitive Exam:  Oriented to: Person, Place, Time and Situation  Follows Commands/attention: Follows two-step commands  Communication: clear/fluent  Memory:  No Deficits noted  Safety awareness/insight to disability: intact  Coping skills/emotional control: confused    Visual/perceptual:  Intact    Physical Exam:  Postural examination/scapula alignment: Rounded  shoulder, Head forward and Kyphosis  Skin integrity: Visible skin intact and Thin  Edema: Mild lle    Sensation:   Intact    Upper Extremity Range of Motion:  Right Upper Extremity: WFL  Left Upper Extremity: WFL    Upper Extremity Strength:  Right Upper Extremity: mmt: 3/5 grossly  Left Upper Extremity: mmt:3/5 GROSSLY   Strength: MMT: 3/5      Fine motor coordination:   UNABLE TO ACCURATELY ASSESS    Gross motor coordination: WFL    Functional Mobility:  Bed Mobility:  Rolling/Turning to Left: Moderate assistance  Scooting/Bridging: Moderate Assistance  Sit to Supine: Moderate Assistance    Transfers:  Sit <> Stand Assistance: Moderate Assistance  Sit <> Stand Assistive Device: Rolling Walker  Bed <> Chair Technique: Stand Pivot  Bed <> Chair Transfer Assistance: Moderate Assistance  Bed <> Chair Assistive Device: Rolling Walker    Functional Ambulation: PT REQ MOD A WITH FUNCTIONAL T/F'S, POOR SAFETY, RW AND IV POLE IN TOW.    Activities of Daily Living:     Feeding adaptive equipment: NA  UE Dressing Level of Assistance: Total assistance  UE adaptive equipment: NA  LE Dressing Level of Assistance: Total assistance  LE adaptive equipment: NA                    Bathing adaptive equipment: NA    Balance:   Static Sit: FAIR SITTING BALANCE UNSUPPORTED AND GOOD  SITTING BALANCE SUPPORTED  Dynamic Sit: POOR: N/A  Static Stand: POOR: Needs MODERATE assist to maintain  Dynamic stand: POOR: N/A    Therapeutic Activities and Exercises:      AM-PAC 6 CLICK ADL  How much help from another person does this patient currently need?  1 = Unable, Total/Dependent Assistance  2 = A lot, Maximum/Moderate Assistance  3 = A little, Minimum/Contact Guard/Supervision  4 = None, Modified [place]/Independent    Putting on and taking off regular lower body clothing? : 2  Bathing (including washing, rinsing, drying)?: 2  Toileting, which includes using toilet, bedpan, or urinal? : 2  Putting on and taking off regular upper body  "clothing?: 2  Taking care of personal grooming such as brushing teeth?: 2  Eating meals?: 3  Total Score: 13    AM-PAC Raw Score CMS "G-Code Modifier Level of Impairment Assistance   6 % Total / Unable   7 - 9 CM 80 - 100% Maximal Assist   10-14 CL 60 - 80% Moderate Assist   15 - 19 CK 40 - 60% Moderate Assist   20 - 22 CJ 20 - 40% Minimal Assist   23 CI 1-20% SBA / CGA   24 CH 0% Independent/ Mod I       Patient left HOB elevated with all lines intact, call button in reach and NURSE ZOHRA notified    Assessment:  Herlinda Tamez is a 89 y.o. female with a medical diagnosis of S/P ORIF (open reduction internal fixation) fracture and presents with Impaired adl's, impaired functional mobility, impaired t/f's AND IMPAIRED B UE STRENGTH/ENDURANCE. PT WILL BENEFIT FROM SKILLED O.T      Rehab identified problem list/impairments: Rehab identified problem list/impairments: weakness, impaired self care skills, impaired balance, decreased coordination, decreased safety awareness, impaired endurance, impaired functional mobilty, visual deficits, gait instability    Rehab potential is good.    Activity tolerance: Fair    Discharge recommendations: Discharge Facility/Level Of Care Needs: nursing facility, skilled     Barriers to discharge: Barriers to Discharge: None    Equipment recommendations: walker, rolling     GOALS:    Occupational Therapy Goals        Problem: Occupational Therapy Goal    Goal Priority Disciplines Outcome Interventions   Occupational Therapy Goal     OT, PT/OT     Description:  OT GOALS TO BE MET 8-15-17  1. MOD A WITH UE DRESSING  2. PT WILL TOLERATE 1 SET X 10 REPS B UE ROM EXERCISE  3. MIN A WITH BSC T/F'S.                     PLAN:  Patient to be seen 3 x/week to address the above listed problems via self-care/home management, therapeutic activities, therapeutic exercises  Plan of Care expires:    Plan of Care reviewed with: patient         Barbara Arnettzier, OT  08/08/2017  "

## 2017-08-08 NOTE — PROGRESS NOTES
Pharmacist Intervention IV to PO Note    Herlinda Tamez is a 89 y.o. female being treated with IV medication pantoprazole    Patient Data:    Vital Signs (Most Recent):  Temp: 97.7 °F (36.5 °C) (08/08/17 0820)  Pulse: 97 (08/08/17 0820)  Resp: 18 (08/08/17 0820)  BP: (!) 122/57 (08/08/17 0820)  SpO2: 97 % (08/08/17 0820)   Vital Signs (72h Range):  Temp:  [97 °F (36.1 °C)-98.7 °F (37.1 °C)]   Pulse:  []   Resp:  [10-82]   BP: (113-180)/()   SpO2:  [95 %-100 %]      CBC:    Recent Labs     Lab 08/06/17 1207 08/08/17 0458   WBC 7.56 --    RBC 3.50* --    HGB 11.9* 10.1*   HCT 35.3* 30.0*    --    * --    MCH 34.0* --    MCHC 33.7 --      CMP:     Recent Labs     Lab 08/06/17 1207 08/08/17  0458   GLU 95 75   CALCIUM 8.5* 7.9*   ALBUMIN 3.2* --    PROT 6.6 --     136   K 4.0 3.9   CO2 24 23    105   BUN 13 14   CREATININE 0.6 0.6   ALKPHOS 64 --    ALT 26 --    AST 29 --    BILITOT 0.8 --        Dietary Orders:  Diet Orders            Diet Adult Regular: Regular starting at 08/08 1209            Based on the following criteria, this patient qualifies for intravenous to oral conversion:  [x] The patients gastrointestinal tract is functioning (tolerating medications via oral or enteral route for 24 hours and tolerating food or enteral feeds for 24 hours).  [x] The patient is hemodynamically stable for 24 hours (heart rate <100 beats per minute, systolic blood pressure >99 mm Hg, and respiratory rate <20 breaths per minute).  [x] The patient shows clinical improvement (afebrile for at least 24 hours and white blood cell count downtrending or normalized). Additionally, the patient must be non-neutropenic (absolute neutrophil count >500 cells/mm3).  [x] For antimicrobials, the patient has received IV therapy for at least 24 hours.    IV medication Protonix will be changed to oral medication Protonix    Pharmacist's Name: Pauline Bradley  Pharmacist's Extension: [phone]

## 2017-08-08 NOTE — SUBJECTIVE & OBJECTIVE
Interval History: No events overnight. Pain controlled. Family at bedside, plan of care discussed.     Review of Systems   Unable to perform ROS: Dementia     Objective:     Vital Signs (Most Recent):  Temp: 97.7 °F (36.5 °C) (08/08/17 0347)  Pulse: 110 (08/08/17 0638)  Resp: 20 (08/08/17 0347)  BP: (!) 113/55 (08/08/17 0347)  SpO2: 97 % (08/08/17 0347) Vital Signs (24h Range):  Temp:  [97.7 °F (36.5 °C)-98.6 °F (37 °C)] 97.7 °F (36.5 °C)  Pulse:  [] 110  Resp:  [10-21] 20  SpO2:  [96 %-100 %] 97 %  BP: (113-180)/() 113/55     Weight: 35.4 kg (78 lb)  Body mass index is 15.23 kg/m².    Intake/Output Summary (Last 24 hours) at 08/08/17 1216  Last data filed at 08/08/17 0614   Gross per 24 hour   Intake             3125 ml   Output              370 ml   Net             2755 ml      Physical Exam   Constitutional: She appears well-developed and well-nourished.   Emaciated    HENT:   Head: Normocephalic and atraumatic.   Eyes: Conjunctivae and EOM are normal. Pupils are equal, round, and reactive to light.   Neck: Normal range of motion. Neck supple.   Cardiovascular: Normal rate, regular rhythm, normal heart sounds and intact distal pulses.    Pulmonary/Chest: Effort normal and breath sounds normal.   Abdominal: Soft. Bowel sounds are normal.   Musculoskeletal:        Left hip: She exhibits decreased range of motion, tenderness and swelling (mild ).   Left  Hip dressing intact, 2 plus dorsalis pedis and posterior tibial artery pulses, light touch intact Left lower extremity.  All digits are warm. No erythema, no warmth, no drainage. Less than 2 seconds capillary refill all digits.   Neurological: She is alert. She has normal reflexes. She is disoriented.   Skin: Skin is warm and dry.   Psychiatric: She has a normal mood and affect. Her behavior is normal. Judgment and thought content normal.   Nursing note and vitals reviewed.      Significant Labs:   BMP:   Recent Labs  Lab 08/06/17  1846 08/08/17  0458    GLU  --  75   NA  --  136   K  --  3.9   CL  --  105   CO2  --  23   BUN  --  14   CREATININE  --  0.6   CALCIUM  --  7.9*   MG 1.7  --      CBC:   Recent Labs  Lab 08/08/17 0458   HGB 10.1*   HCT 30.0*     CMP:   Recent Labs  Lab 08/08/17 0458      K 3.9      CO2 23   GLU 75   BUN 14   CREATININE 0.6   CALCIUM 7.9*   ANIONGAP 8   EGFRNONAA >60     All pertinent labs within the past 24 hours have been reviewed.    Significant Imaging:   Imaging Results          X-Ray Hip 2 or 3 views Left (Final result)  Result time 08/07/17 15:46:05    Final result by Ravi Palmer MD (08/07/17 15:46:05)                 Impression:         As above.        Electronically signed by: RAVI PALMER MD  Date:     08/07/17  Time:    15:46              Narrative:    Exam: XR HIP 2 VIEW LEFT    Clinical History:    Left hip surgical fixation.     Findings:      5 fluoroscopy images are obtained during open reduction and internal fixation of the left femoral neck fracture.  Fluoroscopy time was 99.4 seconds.                             FL Less Than 1 Hour (In process)                CT Head Without Contrast (Final result)  Result time 08/06/17 20:33:58    Final result by PEEWEE Tapia Sr., MD (08/06/17 20:33:58)                 Impression:      1.  This is a limited examination secondary to patient motion.  Excluding motion artifact, there is no evidence of a calvarial fracture or intracranial hemorrhage.   2.  There is no evidence of an acute ischemic event.        All CT scans at this facility use dose modulation, iterative reconstruction, and/or weight base dosing when appropriate to reduce radiation dose when appropriate to reduce radiation dose to as low as reasonably achievable.      Electronically signed by: PEEWEE TAPIA MD  Date:     08/06/17  Time:    20:33              Narrative:    CT of Head without IV contrast    History:     Headache status post fall    Technique: Standard brain CT protocol without IV contrast  was performed.     Finding: This is a limited examination secondary to patient motion.  Excluding motion artifact, there is no evidence of a calvarial fracture or intracranial hemorrhage.  There are moderate chronic appearing ischemic changes in the deep white matter of both cerebral hemispheres.  There is no evidence of an acute ischemic event.  The visualized portion of the paranasal sinuses is clear.                             US Carotid Bilateral (Final result)  Result time 08/06/17 19:32:08    Final result by PEEWEE Tapia Sr., MD (08/06/17 19:32:08)                 Impression:         Normal study    Validated velocity measurements with angiographic measurements, velocity criteria are extrapolated from diameter data as defined by the Society of Radiologists in Ultrasound Consensus Conference Radiology 2003; 229; 340-346      Electronically signed by: PEEWEE TAPIA MD  Date:     08/06/17  Time:    19:32              Narrative:    Ultrasound examination of the carotid arteries with color flow and spectral Doppler imaging    History:     Neck pain status post fall; Possible carotid artery stenosis    Technique: Multiple static ultrasound images are submitted for interpretation.    Finding: The following pertains to the right side of the neck. The common carotid artery has a normal arterial waveform with peak systolic velocity of 81 cm/sec and a diastolic velocity of 10 cm/sec. The internal carotid artery has a normal arterial waveform with a peak systolic velocity of 79 cm/sec and a diastolic velocity of 16 cm/sec. The external carotid artery has a peak systolic velocity of 55 cm/sec. The vertebral artery has normal antegrade flow.    The following pertains to the left side of the neck. The common carotid artery has a normal arterial waveform with peak systolic velocity of 90 cm/sec and a diastolic velocity of 15 cm/sec. The internal carotid artery has a normal arterial waveform with a peak systolic velocity of  79 cm/sec and a diastolic velocity of 14 cm/sec. The external carotid artery has a peak systolic velocity of 88 cm/sec. The vertebral artery has normal antegrade flow.                             X-Ray Chest AP Portable (Final result)  Result time 08/06/17 12:17:05    Final result by Alec Carey MD (08/06/17 12:17:05)                 Impression:     Normal chest      Electronically signed by: ALEC CAREY MD  Date:     08/06/17  Time:    12:17              Narrative:    Portable chest    Clinical indication blunt chest trauma and preop    Findings: The heart and lungs are normal.  There are no infiltrates.                             X-Ray Hip 2 View Left (Final result)  Result time 08/06/17 12:16:40    Final result by Alec Carey MD (08/06/17 12:16:40)                 Impression:     Slightly angled intertrochanteric fracture of the left hip      Electronically signed by: ALEC CAREY MD  Date:     08/06/17  Time:    12:16              Narrative:    Left hip 3 views    Clinical indication: Hip pain and trauma    Findings: There is a slightly comminuted fracture through the intertrochanteric region of the left hip with slight valgus angulation.  No dislocation.

## 2017-08-08 NOTE — PT/OT/SLP EVAL
Physical Therapy  Evaluation    Herlinda Tamez   MRN: [id number]   Admitting Diagnosis: S/P ORIF (open reduction internal fixation) fracture    PT Received On: 08/08/17  PT Start Time: 0855     PT Stop Time: 0920    PT Total Time (min): 25 min       Billable Minutes:  Evaluation 15 and Gait [id number]    Diagnosis: S/P ORIF (open reduction internal fixation) fracture  P.T. DX: GT INSTABILITY     Past Medical History:   Diagnosis Date    Dementia     Osteoporosis       Past Surgical History:   Procedure Laterality Date    fracture repair      fracture tailbone reparied       Referring physician: MELISSA  Date referred to PT: 8/8/2017      General Precautions: Standard, fall  Orthopedic Precautions: LLE weight bearing as tolerated   Braces:              Patient History:  Living Arrangements: residential facility  Home Layout: Able to live on 1st floor  Living Environment Comment: PT LIVES AT NH AND WAS AMBULATORY WITH RW SHORT DISTANCES PER NIECE.   Equipment Currently Used at Home: walker, rolling  DME owned (not currently used): none    Previous Level of Function:  Ambulation Skills: needs device and assist  Transfer Skills: needs device and assist  ADL Skills: needs device and assist    Subjective:  Communicated with NURSE SLATER AND EPIC CHART REVIEW prior to session.  PT AGREED TO EVAL AND TX   Chief Complaint: PAIN   Patient goals: GO HOME     Pain/Comfort  Pain Rating 1: 6/10  Location - Side 1: Left  Location 1: leg  Pain Rating Post-Intervention 1: 6/10      Objective:   Patient found with: peripheral IV     Cognitive Exam:  Oriented to: Person    Follows Commands/attention: Follows multistep  commands  Communication: clear/fluent  Safety awareness/insight to disability: IMPAIRED      Physical Exam:  Postural examination/scapula alignment: Rounded shoulder and Head forward    Skin integrity: Visible skin intact  Edema: Mild L LE    Lower Extremity Range of Motion:  Right Lower Extremity: WFL  Left Lower  Extremity: LIMITED    Lower Extremity Strength:  Right Lower Extremity: WFL  Left Lower Extremity: LIMITED      Functional Mobility:  Bed Mobility:  Rolling/Turning Right: Maximum assistance  Supine to Sit: Maximum Assistance    Transfers:  Sit <> Stand Assistance: Moderate Assistance  Sit <> Stand Assistive Device: Rolling Walker  Bed <> Chair Technique: Stand Pivot  Bed <> Chair Assistance: Moderate Assistance  Bed <> Chair Assistive Device: Rolling Walker    Gait:   Gait Distance: PT GT TRAINED WITH RW STEP TO GT AND RW AND MOD A WITH SLOW PACE AND FLEXED POSTURE.   Assistance 1: Moderate assistance  Gait Assistive Device: Rolling walker  Gait Pattern: swing-to gait  Gait Deviation(s): decreased kirby, decreased weight-shifting ability, forward lean    Therapeutic Activities and Exercises:   PT STOOD WITH RW AND MOD A FOR GT X 10' WITH RW AND CHAIR IN TOW FOR SAFETY. PT WITH CUES FOR WT SHIFT AND RW SAFETY. PT LEFT SEATED IN CHAIR AND EDUCATED PT AND FAMILY ON ROLL OF P.T.     AM-PAC 6 CLICK MOBILITY  How much help from another person does this patient currently need?   1 = Unable, Total/Dependent Assistance  2 = A lot, Maximum/Moderate Assistance  3 = A little, Minimum/Contact Guard/Supervision  4 = None, Modified [place]/Independent    Turning over in bed (including adjusting bedclothes, sheets and blankets)?: 2  Sitting down on and standing up from a chair with arms (e.g., wheelchair, bedside commode, etc.): 2  Moving from lying on back to sitting on the side of the bed?: 2  Moving to and from a bed to a chair (including a wheelchair)?: 2  Need to walk in hospital room?: 2  Climbing 3-5 steps with a railing?: 1  Total Score: 11     AM-PAC Raw Score CMS G-Code Modifier Level of Impairment Assistance   6 % Total / Unable   7 - 9 CM 80 - 100% Maximal Assist   10 - 14 CL 60 - 80% Moderate Assist   15 - 19 CK 40 - 60% Moderate Assist   20 - 22 CJ 20 - 40% Minimal Assist   23 CI 1-20% SBA / CGA   24  CH 0% Independent/ Mod I     Patient left up in chair with call button in reach.    Assessment:   Herlinda Tamez is a 89 y.o. female with a medical diagnosis of S/P ORIF (open reduction internal fixation) fracture and presents with DEBILITY AND GT INSTABILITY PT WILL BENEFIT FROM P.T. TO ADDRESS IMPAIRMENTS.    Rehab identified problem list/impairments: Rehab identified problem list/impairments: weakness, impaired endurance, gait instability, impaired functional mobilty, impaired balance, pain, decreased lower extremity function, decreased ROM    Rehab potential is good.    Activity tolerance: Fair    Discharge recommendations: Discharge Facility/Level Of Care Needs: nursing facility, skilled     Barriers to discharge: Barriers to Discharge: None    Equipment recommendations:       GOALS:    Physical Therapy Goals        Problem: Physical Therapy Goal    Goal Priority Disciplines Outcome Goal Variances Interventions   Physical Therapy Goal     PT/OT, PT      Description:  PT WILL BE SEEN FOR P.T. FOR A MIN OF 5 OUT OF 7 DAYS A WEEK  LT/15/17  1. PT WILL COMPLETE BED MOBILITY WITH MIN A.  2. PT WILL T/F TO CHAIR WITH RW AND MIN A.   3. PT WILL GT TRAIN X 50' WITH WBAT AND MIN A.   4. PT WILL COMPLETE B LE TE X 20 REPS FOR STRENGTHENING.                    PLAN:    Patient to be seen   to address the above listed problems via gait training, therapeutic activities, therapeutic exercises  Plan of Care expires: 08/15/17  Plan of Care reviewed with: patient    Functional Assessment Tool Used: [place] AM PAC  Score: CM  Functional Limitation: Mobility: Walking and moving around  Mobility: Walking and Moving Around Current Status (): CM  Mobility: Walking and Moving Around Goal Status (): KATE Jennings, PT  2017

## 2017-08-08 NOTE — PLAN OF CARE
CM attempted to meet with pt for assessment but pt asleep.  Pt caregiver in the room - caregiver states that she is part of a private sitter services employed by pt's family to care for pt.  She states that even if pt were not asleep, the pt would not be able to comprehend or participate in assessment.  Pt family member expected to come to hospital this morning.  CM will follow up to discuss d/c plans with family member.       08/08/17 0898   Discharge Assessment   Assessment Type Discharge Planning Assessment   Confirmed/corrected address and phone number on facesheet? Yes   Assessment information obtained from? Caregiver;Medical Record   Prior to hospitilization cognitive status: Unable to Assess   Prior to hospitalization functional status: Needs Assistance;Assistive Equipment   Current cognitive status: Not Oriented to Person  (not oriented to situation, per caregiver)   Current Functional Status: Needs Assistance;Assistive Equipment   Able to Return to Prior Arrangements unable to determine at this time (comments)   Is patient able to care for self after discharge? No   Readmission Within The Last 30 Days no previous admission in last 30 days   Patient currently being followed by outpatient case management? No   Equipment Currently Used at Home (currently unknown)   Do you have any problems affording any of your prescribed medications? TBD   On Dialysis? No   Are there any open cases? No   Discharge Plan A Skilled Nursing Facility   Patient/Family In Agreement With Plan unable to assess

## 2017-08-08 NOTE — PROGRESS NOTES
[facility] Hospital Medicine  Progress Note    Patient Name: Herlinda Tamez  MRN: [id number]  Patient Class: IP- Inpatient   Admission Date: 8/6/2017  Length of Stay: 2 days  Attending Physician: Dayanara Cruz MD  Primary Care Provider: Mckay Hurtado MD        Subjective:     Principal Problem:S/P ORIF (open reduction internal fixation) fracture    HPI:  Herlinda Tamez is a 89 y.o. female patient who has PMHx of dementia presents to the Emergency Department for L hip pain which onset gradually PTA. [facility] reports pt falling at nursing home and being found on the floor. Fall was unwitnessed. [facility] reports noticeable L leg shortening. [facility] reports pt being given Tylenol PO at nursing home and Fentanyl IV 75 mg en route.  No associated sxs. Patient denies any HA, numbness, weakness, dizziness, back pain, neck pain, knee pain, abd pain, CP, SOB, and all other sxs at this time. Pt's family reports pt normally is able to walk with a walker. ED evaluation which showed unremarkable lab studies. XR left hip shows there is a slightly comminuted fracture through the intertrochanteric region of the left hip with slight valgus angulation. No dislocation. CXR normal. Ortho consult.     Hospital Course:  89 year old female admitted for left hip fracture. ED evaluation which showed unremarkable lab studies. XR left hip shows there is a slightly comminuted fracture through the intertrochanteric region of the left hip with slight valgus angulation. No dislocation. CXR normal. Ortho consult. Cariology consult for surgical clearance. Troponin negative. Magnesium and TSH normal. CT of head was limited examination secondary to patient motion. Excluding motion artifact, there is no evidence of a calvarial fracture or intracranial hemorrhage. There is no evidence of an acute ischemic event. Shashank Carotid US normal. 2D Echo shows normal EF 60-65%, Mild aortic regurgitation. EKG Sinus rhythm with Premature atrial complexes.  Patient Intermediate risk. Patient underwent Left hip ORIF 8/7/2017, pt. Tolerated procedure well. IS. H/H stable. Pain controlled. Lovenox 40mg for DVT prophylaxis. PT/OT consult. Social work consult for discharge planning.      Interval History: No events overnight. Pain controlled. Family at bedside, plan of care discussed.     Review of Systems   Unable to perform ROS: Dementia     Objective:     Vital Signs (Most Recent):  Temp: 97.7 °F (36.5 °C) (08/08/17 0347)  Pulse: 110 (08/08/17 0638)  Resp: 20 (08/08/17 0347)  BP: (!) 113/55 (08/08/17 0347)  SpO2: 97 % (08/08/17 0347) Vital Signs (24h Range):  Temp:  [97.7 °F (36.5 °C)-98.6 °F (37 °C)] 97.7 °F (36.5 °C)  Pulse:  [] 110  Resp:  [10-21] 20  SpO2:  [96 %-100 %] 97 %  BP: (113-180)/() 113/55     Weight: 35.4 kg (78 lb)  Body mass index is 15.23 kg/m².    Intake/Output Summary (Last 24 hours) at 08/08/17 1216  Last data filed at 08/08/17 0614   Gross per 24 hour   Intake             3125 ml   Output              370 ml   Net             2755 ml      Physical Exam   Constitutional: She appears frail and thin.  Emaciated    HENT:   Head: Normocephalic and atraumatic.   Eyes: Conjunctivae and EOM are normal. Pupils are equal, round, and reactive to light.   Neck: Normal range of motion. Neck supple.   Cardiovascular: Normal rate, regular rhythm, normal heart sounds and intact distal pulses.    Pulmonary/Chest: Effort normal and breath sounds normal.   Abdominal: Soft. Bowel sounds are normal.   Musculoskeletal:        Left hip: She exhibits decreased range of motion, tenderness and swelling (mild ).   Left  Hip dressing intact, 2 plus dorsalis pedis and posterior tibial artery pulses, light touch intact Left lower extremity.  All digits are warm. No erythema, no warmth, no drainage. Less than 2 seconds capillary refill all digits.   Neurological: She is alert. She has normal reflexes. She is disoriented.   Skin: Skin is warm and dry.   Psychiatric:  She has a normal mood and affect. Her behavior is normal. Judgment and thought content normal.   Nursing note and vitals reviewed.      Significant Labs:   BMP:   Recent Labs  Lab 08/06/17  1846 08/08/17 0458   GLU  --  75   NA  --  136   K  --  3.9   CL  --  105   CO2  --  23   BUN  --  14   CREATININE  --  0.6   CALCIUM  --  7.9*   MG 1.7  --      CBC:   Recent Labs  Lab 08/08/17 0458   HGB 10.1*   HCT 30.0*     CMP:   Recent Labs  Lab 08/08/17  0458      K 3.9      CO2 23   GLU 75   BUN 14   CREATININE 0.6   CALCIUM 7.9*   ANIONGAP 8   EGFRNONAA >60     All pertinent labs within the past 24 hours have been reviewed.    Significant Imaging:   Imaging Results          X-Ray Hip 2 or 3 views Left (Final result)  Result time 08/07/17 15:46:05    Final result by Ravi Palmer MD (08/07/17 15:46:05)                 Impression:         As above.        Electronically signed by: RAVI PALMER MD  Date:     08/07/17  Time:    15:46              Narrative:    Exam: XR HIP 2 VIEW LEFT    Clinical History:    Left hip surgical fixation.     Findings:      5 fluoroscopy images are obtained during open reduction and internal fixation of the left femoral neck fracture.  Fluoroscopy time was 99.4 seconds.                             FL Less Than 1 Hour (In process)                CT Head Without Contrast (Final result)  Result time 08/06/17 20:33:58    Final result by PEEWEE Tapia Sr., MD (08/06/17 20:33:58)                 Impression:      1.  This is a limited examination secondary to patient motion.  Excluding motion artifact, there is no evidence of a calvarial fracture or intracranial hemorrhage.   2.  There is no evidence of an acute ischemic event.        All CT scans at this facility use dose modulation, iterative reconstruction, and/or weight base dosing when appropriate to reduce radiation dose when appropriate to reduce radiation dose to as low as reasonably achievable.      Electronically signed  by: PEEWEE TAPIA MD  Date:     08/06/17  Time:    20:33              Narrative:    CT of Head without IV contrast    History:     Headache status post fall    Technique: Standard brain CT protocol without IV contrast was performed.     Finding: This is a limited examination secondary to patient motion.  Excluding motion artifact, there is no evidence of a calvarial fracture or intracranial hemorrhage.  There are moderate chronic appearing ischemic changes in the deep white matter of both cerebral hemispheres.  There is no evidence of an acute ischemic event.  The visualized portion of the paranasal sinuses is clear.                             US Carotid Bilateral (Final result)  Result time 08/06/17 19:32:08    Final result by PEEWEE Tapia Sr., MD (08/06/17 19:32:08)                 Impression:         Normal study    Validated velocity measurements with angiographic measurements, velocity criteria are extrapolated from diameter data as defined by the Society of Radiologists in Ultrasound Consensus Conference Radiology 2003; 229; 340-346      Electronically signed by: PEEWEE TAPIA MD  Date:     08/06/17  Time:    19:32              Narrative:    Ultrasound examination of the carotid arteries with color flow and spectral Doppler imaging    History:     Neck pain status post fall; Possible carotid artery stenosis    Technique: Multiple static ultrasound images are submitted for interpretation.    Finding: The following pertains to the right side of the neck. The common carotid artery has a normal arterial waveform with peak systolic velocity of 81 cm/sec and a diastolic velocity of 10 cm/sec. The internal carotid artery has a normal arterial waveform with a peak systolic velocity of 79 cm/sec and a diastolic velocity of 16 cm/sec. The external carotid artery has a peak systolic velocity of 55 cm/sec. The vertebral artery has normal antegrade flow.    The following pertains to the left side of the neck. The common  carotid artery has a normal arterial waveform with peak systolic velocity of 90 cm/sec and a diastolic velocity of 15 cm/sec. The internal carotid artery has a normal arterial waveform with a peak systolic velocity of 79 cm/sec and a diastolic velocity of 14 cm/sec. The external carotid artery has a peak systolic velocity of 88 cm/sec. The vertebral artery has normal antegrade flow.                             X-Ray Chest AP Portable (Final result)  Result time 08/06/17 12:17:05    Final result by Alec Carey MD (08/06/17 12:17:05)                 Impression:     Normal chest      Electronically signed by: ALEC CAREY MD  Date:     08/06/17  Time:    12:17              Narrative:    Portable chest    Clinical indication blunt chest trauma and preop    Findings: The heart and lungs are normal.  There are no infiltrates.                             X-Ray Hip 2 View Left (Final result)  Result time 08/06/17 12:16:40    Final result by Alec Carey MD (08/06/17 12:16:40)                 Impression:     Slightly angled intertrochanteric fracture of the left hip      Electronically signed by: ALEC CAREY MD  Date:     08/06/17  Time:    12:16              Narrative:    Left hip 3 views    Clinical indication: Hip pain and trauma    Findings: There is a slightly comminuted fracture through the intertrochanteric region of the left hip with slight valgus angulation.  No dislocation.                            Assessment/Plan:      Fall from ground level    Fall Vs Syncope   CT of head shows no acute findings  TSH normal, Troponin negative, CPK normal , Magnesium normal   2d echo shows normal EF 60-65%, mild aortic regurgitation   US carotids normal   Cardiology consult for surgical clearance   Neurocheck q4   orthostatic qshift   fall precautions   Patient intermediate risk for surgery               Dementia    Resume home meds   Fall precautions            * S/P ORIF (open reduction internal fixation)  fracture    -Ortho consult   -XR left hip Slightly angled intertrochanteric fracture of the left hip  -PRN analgesics   -LLE neurovascular and circulation checks q shift   -Patient underwent Left hip ORIF 8/7/2017  -Pt. Tolerated procedure well   -Left hip dressing C/D/I, Pain controlled   -Lovenox 40 mg for DVT prophylaxis   -Fall precautions   -PT/OT consult  -Social work consult for discharge planning               VTE Risk Mitigation         Ordered     enoxaparin injection 40 mg  Daily     Route:  Subcutaneous        08/08/17 1212     Medium Risk of VTE  Once      08/07/17 1658     Place sequential compression device  Until discontinued      08/07/17 1658     Place TANIA hose  Until discontinued      08/06/17 1532     Place sequential compression device  Until discontinued      08/06/17 1532              Jenny Norman NP  Department of Hospital Medicine   [facility]

## 2017-08-08 NOTE — OP NOTE
DATE OF PROCEDURE:  08/07/2017    PREOPERATIVE DIAGNOSIS:  Intertrochanteric fracture of the left hip.    POSTOPERATIVE DIAGNOSIS:  Intertrochanteric fracture of the left hip.    OPERATION PERFORMED:  Intramedullary fixation of intertrochanteric fracture of   left hip with Synthes trochanteric femoral nail.    SURGEON:  Benny Tello M.D.    ASSISTANT:  None.    ANESTHESIA:  General.    DRAINS:  None.    SPECIMENS:  None.    FINDINGS:  Intertrochanteric fracture of the left hip.    COMPLICATIONS:  None.    ESTIMATED BLOOD LOSS:  75 mL.    INDICATIONS:  This 89-year-old female fell sustaining a displaced   intertrochanteric fracture of her left hip.  She was indicated for   intramedullary fixation of her left hip fracture to decrease her pain, diminish   her morbidity and improve her ultimate functional ability.    DESCRIPTION OF OPERATION:  The patient was given a satisfactory general   anesthesia while in her hospital bed.  She was then transferred to the [place]   table and placed in standard position with a well-padded perineal post between   her legs, her feet in well-padded traction boots, and the right leg in extension   and the left hip fracture reduced under image intensification and positioned in   proper traction.  The patient's left hip was prepped and draped in the usual   sterile fashion and a longitudinal incision was made just proximal to the   greater trochanter laterally.  The fascia mikel was divided longitudinally and an   intramedullary guidepin was inserted down the intramedullary canal of the   femur, beginning at the tip of the greater trochanter.  The cannulated drill was   utilized to enlarge the proximal intramedullary canal and an 11 mm diameter   standard length Synthes trochanteric femoral nail was inserted to a proper depth   and an 85 mm long fluted nail was then inserted in standard fashion over a   guidepin placed through the guide into the femoral neck and head at a proper   position  as verified by dynamic AP and lateral image intensification views.  A   transverse locking screw measuring 36 mm in length was then inserted distally in   standard fashion.  Compression was placed across the fracture site after   locking the fluted pin into place with a locking pin proximally.  AP and lateral   dynamic image intensification x-rays showed satisfactory position and alignment   of the fracture fragments and hardware; and the patient's wounds were then   irrigated with bacitracin and saline irrigation solution and hemostasis   maintained with electrocautery.  The wounds were then closed utilizing 0 Vicryl   suture for the deep layer of tissues followed by 2-0 Vicryl running suture for   the subcutaneous tissues and staples for the skin.  Sterile compression   dressings were applied and the patient was transferred to hospital bed, awakened   and taken to the Recovery Room in satisfactory condition.  There were no   complications.  Blood loss was 75 mL.  The patient tolerated the procedure well.      JAZMIN  dd: 08/07/2017 17:16:12 (CDT)  td: 08/08/2017 00:27:42 (CDT)  Doc ID   #[id number]  Job ID #[id number]    CC:

## 2017-08-08 NOTE — SUBJECTIVE & OBJECTIVE
Principal Problem:S/P TFN fixation of left intertrochanteric hip fracture    Principal Orthopedic Problem: Intertrochanteric fracture of left hip     Interval History: The patient had TFN fixation of her left hip fracture yesterday.  She is resting comfortably in bed.  Her hemoglobin and hematocrit are stable.  She has an expected and anticipated postoperative acute anemia.    Review of patient's allergies indicates:  No Known Allergies    Current Facility-Administered Medications   Medication    acetaminophen tablet 650 mg    bisacodyl suppository 10 mg    ceFAZolin (ANCEF) 1 gram in dextrose 5 % 50 mL IVPB (premix)    donepezil tablet 10 mg    enoxaparin injection 40 mg    hydromorphone (PF) injection 0.2 mg    lactated ringers infusion    mirtazapine tablet 7.5 mg    mupirocin 2 % ointment 1 g    ondansetron disintegrating tablet 4 mg    ondansetron injection 4 mg    oxycodone immediate release tablet 5 mg    [START ON 8/9/2017] pantoprazole EC tablet 40 mg    polyethylene glycol packet 17 g    sodium chloride 0.9% flush 3 mL     Objective:     Vital Signs (Most Recent):  Temp: 97.7 °F (36.5 °C) (08/08/17 0820)  Pulse: 97 (08/08/17 0820)  Resp: 18 (08/08/17 0820)  BP: (!) 122/57 (08/08/17 0820)  SpO2: 97 % (08/08/17 0820) Vital Signs (24h Range):  Temp:  [97.7 °F (36.5 °C)-98.6 °F (37 °C)] 97.7 °F (36.5 °C)  Pulse:  [] 97  Resp:  [10-21] 18  SpO2:  [96 %-100 %] 97 %  BP: (113-180)/() 122/57     Weight: 35.4 kg (78 lb)  Height: 5' (152.4 cm)  Body mass index is 15.23 kg/m².      Intake/Output Summary (Last 24 hours) at 08/08/17 1513  Last data filed at 08/08/17 1339   Gross per 24 hour   Intake             3245 ml   Output              370 ml   Net             2875 ml       Ortho/SPM Exam  The patient's dressing is clean and dry and intact.  Neurovascular status is intact.  Significant Labs: All pertinent labs within the past 24 hours have been reviewed.    Significant Imaging: I have  reviewed and interpreted all pertinent imaging results/findings.

## 2017-08-08 NOTE — ASSESSMENT & PLAN NOTE
Fall Vs Syncope   CT of head shows no acute findings  TSH normal, Troponin negative, CPK normal , Magnesium normal   2d echo shows normal EF 60-65%, mild aortic regurgitation   US carotids normal   Cardiology consult for surgical clearance   Neurocheck q4   orthostatic qshift   fall precautions   Patient intermediate risk for surgery

## 2017-08-08 NOTE — PLAN OF CARE
Problem: Patient Care Overview  Goal: Plan of Care Review  Caregiver at bedside. Pt removed IV's during shift. Pt turned Q2. Pt confused and disoriented. Resendez will be removed before end of this shift this morning. Darkened skin to gluteal folds noted on chart. Bed alarm activated. 24 hour chart check completed.

## 2017-08-09 VITALS
BODY MASS INDEX: 15.31 KG/M2 | OXYGEN SATURATION: 96 % | SYSTOLIC BLOOD PRESSURE: 132 MMHG | RESPIRATION RATE: 16 BRPM | HEART RATE: 96 BPM | TEMPERATURE: 96 F | HEIGHT: 60 IN | DIASTOLIC BLOOD PRESSURE: 61 MMHG | WEIGHT: 78 LBS

## 2017-08-09 PROBLEM — Z98.890 S/P ORIF (OPEN REDUCTION INTERNAL FIXATION) FRACTURE: Chronic | Status: ACTIVE | Noted: 2017-08-06

## 2017-08-09 PROBLEM — Z87.81 S/P ORIF (OPEN REDUCTION INTERNAL FIXATION) FRACTURE: Chronic | Status: ACTIVE | Noted: 2017-08-06

## 2017-08-09 PROBLEM — D62 ACUTE BLOOD LOSS ANEMIA: Chronic | Status: ACTIVE | Noted: 2017-08-08

## 2017-08-09 PROBLEM — S72.002A: Status: ACTIVE | Noted: 2017-08-09
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
## 2017-08-09 LAB
ANION GAP SERPL CALC-SCNC: 7 MMOL/L
BASOPHILS # BLD AUTO: 0.02 K/UL
BASOPHILS NFR BLD: 0.3 %
BUN SERPL-MCNC: 14 MG/DL
CALCIUM SERPL-MCNC: 7.6 MG/DL
CHLORIDE SERPL-SCNC: 106 MMOL/L
CO2 SERPL-SCNC: 23 MMOL/L
CREAT SERPL-MCNC: 0.6 MG/DL
DIFFERENTIAL METHOD: ABNORMAL
EOSINOPHIL # BLD AUTO: 0.1 K/UL
EOSINOPHIL NFR BLD: 1.8 %
ERYTHROCYTE [DISTWIDTH] IN BLOOD BY AUTOMATED COUNT: 13.7 %
EST. GFR  (AFRICAN AMERICAN): >60 ML/MIN/1.73 M^2
EST. GFR  (NON AFRICAN AMERICAN): >60 ML/MIN/1.73 M^2
GLUCOSE SERPL-MCNC: 115 MG/DL
HCT VFR BLD AUTO: 27.6 %
HGB BLD-MCNC: 9.3 G/DL
LYMPHOCYTES # BLD AUTO: 1.4 K/UL
LYMPHOCYTES NFR BLD: 22.3 %
MCH RBC QN AUTO: 33.9 PG
MCHC RBC AUTO-ENTMCNC: 33.7 G/DL
MCV RBC AUTO: 101 FL
MONOCYTES # BLD AUTO: 0.8 K/UL
MONOCYTES NFR BLD: 12.2 %
NEUTROPHILS # BLD AUTO: 4 K/UL
NEUTROPHILS NFR BLD: 63.4 %
PLATELET # BLD AUTO: 137 K/UL
PMV BLD AUTO: 10.8 FL
POTASSIUM SERPL-SCNC: 4 MMOL/L
RBC # BLD AUTO: 2.74 M/UL
SODIUM SERPL-SCNC: 136 MMOL/L
WBC # BLD AUTO: 6.24 K/UL

## 2017-08-09 PROCEDURE — 85025 COMPLETE CBC W/AUTO DIFF WBC: CPT

## 2017-08-09 PROCEDURE — 97530 THERAPEUTIC ACTIVITIES: CPT

## 2017-08-09 PROCEDURE — 99900035 HC TECH TIME PER 15 MIN (STAT)

## 2017-08-09 PROCEDURE — 25000003 PHARM REV CODE 250: Performed by: ORTHOPAEDIC SURGERY

## 2017-08-09 PROCEDURE — 36415 COLL VENOUS BLD VENIPUNCTURE: CPT

## 2017-08-09 PROCEDURE — 25000003 PHARM REV CODE 250: Performed by: INTERNAL MEDICINE

## 2017-08-09 RX ORDER — ACETAMINOPHEN 325 MG/1
650 TABLET ORAL EVERY 6 HOURS
Refills: 0 | COMMUNITY
Start: 2017-08-09

## 2017-08-09 RX ORDER — ASPIRIN 81 MG/1
81 TABLET ORAL DAILY
Refills: 0 | COMMUNITY
Start: 2017-08-09 | End: 2017-08-23

## 2017-08-09 RX ADMIN — PANTOPRAZOLE SODIUM 40 MG: 40 TABLET, DELAYED RELEASE ORAL at 09:08

## 2017-08-09 RX ADMIN — MUPIROCIN 1 G: 20 OINTMENT TOPICAL at 09:08

## 2017-08-09 RX ADMIN — ACETAMINOPHEN 650 MG: 325 TABLET ORAL at 01:08

## 2017-08-09 RX ADMIN — POLYETHYLENE GLYCOL 3350 17 G: 17 POWDER, FOR SOLUTION ORAL at 09:08

## 2017-08-09 RX ADMIN — OXYCODONE HYDROCHLORIDE 5 MG: 5 TABLET ORAL at 03:08

## 2017-08-09 NOTE — PLAN OF CARE
08/09/17 1131   Medicare Message   Important Message from Medicare regarding Discharge Appeal Rights Given to patient/caregiver;Explained to patient/caregiver;Signed/date by patient/caregiver   Date IMM was signed 08/09/17   Time IMM was signed 1020        08/09/17 1131   Medicare Message   Important Message from Medicare regarding Discharge Appeal Rights Given to patient/caregiver;Explained to patient/caregiver;Signed/date by patient/caregiver   Date IMM was signed 08/09/17   Time IMM was signed 1020

## 2017-08-09 NOTE — DISCHARGE SUMMARY
[facility] Hospital Medicine  Discharge Summary      Patient Name: Herlinda Tamez  MRN: [id number]  Admission Date: 8/6/2017  Hospital Length of Stay: 3 days  Discharge Date and Time:  08/09/2017 10:26 AM  Attending Physician: Dayanara Cruz MD   Discharging Provider: Jenny Norman NP  Primary Care Provider: Mckay Hurtado MD      HPI:   Herlinda Tamez is a 89 y.o. female patient who has PMHx of dementia presents to the Emergency Department for L hip pain which onset gradually PTA. [facility] reports pt falling at nursing home and being found on the floor. Fall was unwitnessed. [facility] reports noticeable L leg shortening. [facility] reports pt being given Tylenol PO at nursing home and Fentanyl IV 75 mg en route.  No associated sxs. Patient denies any HA, numbness, weakness, dizziness, back pain, neck pain, knee pain, abd pain, CP, SOB, and all other sxs at this time. Pt's family reports pt normally is able to walk with a walker. ED evaluation which showed unremarkable lab studies. XR left hip shows there is a slightly comminuted fracture through the intertrochanteric region of the left hip with slight valgus angulation. No dislocation. CXR normal. Ortho consult.     Procedure(s) (LRB):  [id number] (Left)      Indwelling Lines/Drains at time of discharge:   Lines/Drains/Airways          No matching active lines, drains, or airways        Hospital Course:   89 year old female admitted for left hip fracture. ED evaluation which showed unremarkable lab studies. XR left hip shows there is a slightly comminuted fracture through the intertrochanteric region of the left hip with slight valgus angulation. No dislocation. CXR normal. Ortho consult. Cariology consult for surgical clearance. Troponin negative. Magnesium and TSH normal. CT of head was limited examination secondary to patient motion. Excluding motion artifact, there is no evidence of a calvarial fracture or intracranial  hemorrhage. There is no evidence of an acute ischemic event. Shashank Carotid US normal. 2D Echo shows normal EF 60-65%, Mild aortic regurgitation. EKG Sinus rhythm with Premature atrial complexes. Patient Intermediate risk. Patient underwent Left hip ORIF 8/7/2017, pt. Tolerated procedure well. IS. H/H stable. Pain controlled. Lovenox 40mg for DVT prophylaxis during hospitalization. PT/OT consult. Social work consult for discharge planning. Patient discharged per Dr. Spivey (ortho). Patient's dressing was changed this morning with sterile 4x4s and microfoam tape. Patient will be discharge back to Nursing home with PT/OT. She will be WBAT on LLE with a roller walker. Patient to follow-up with Orthopedics in 2 weeks to have her staples removed. Due to patient age and body habitus Dr. Spivey (Ortho) recommends ASA for DVT prophylaxis due to increased risk of bleeding and falls. Patient seen and examined on date of discharge and found suitable for discharge.      Consults:   Consults         Status Ordering Provider     Inpatient consult to Cardiology  Once     Specialty:  Cardiology  Provider:  Javier Hernandez MD    Completed ESSIE TORRES     Inpatient consult to Hospitalist  Once     Provider:  (Not yet assigned)    Acknowledged LUZ JOEL     Inpatient consult to Orthopedic Surgery  Once     Provider:  Benny Spivey MD    Acknowledged LUZ JOEL     IP consult case management/social work  Once     Provider:  (Not yet assigned)    BENNY Sawyer     IP consult case management/social work  Once     Provider:  (Not yet assigned)    Completed BENNY SPIVEY          Significant Diagnostic Studies: Labs:   BMP:   Recent Labs  Lab 08/08/17  0458 08/08/17  2355   GLU 75 115*    136   K 3.9 4.0    106   CO2 23 23   BUN 14 14   CREATININE 0.6 0.6   CALCIUM 7.9* 7.6*   , CMP   Recent Labs  Lab 08/08/17  0458 08/08/17  2355    136   K 3.9 4.0    106   CO2 23 23   GLU 75 115*   BUN 14 14  "  CREATININE 0.6 0.6   CALCIUM 7.9* 7.6*   ANIONGAP 8 7*   ESTGFRAFRICA >60 >60   EGFRNONAA >60 >60   , CBC   Recent Labs  Lab 08/08/17  0458 08/09/17  0502   WBC  --  6.24   HGB 10.1* 9.3*   HCT 30.0* 27.6*   PLT  --  137*    and All labs within the past 24 hours have been reviewed    Pending Diagnostic Studies:     Procedure Component Value Units Date/Time    FL Less Than 1 Hour [[id number]] Resulted:  08/07/17 1523    Order Status:  Sent Lab Status:  In process Updated:  08/07/17 1524        Final Active Diagnoses:    Diagnosis Date Noted POA    PRINCIPAL PROBLEM:  Closed fracture of left hip requiring operative repair [S72.002A] 08/09/2017 Yes    Acute blood loss anemia [D62] 08/08/2017 No     Chronic    S/P ORIF (open reduction internal fixation) fracture [Z96.7, Z87.81] 08/06/2017 Not Applicable     Chronic    Dementia [F03.90] 08/06/2017 Yes     Chronic    Fall from ground level [W18.30XA] 08/06/2017 Yes     Chronic      Problems Resolved During this Admission:    Diagnosis Date Noted Date Resolved POA      Discharged Condition: stable    Disposition: Home or Self Care    Follow Up:  Follow-up Information     Kandy Martínez PA-C. Schedule an appointment as soon as possible for a visit in 2 weeks.    Specialty:  Orthopedic Surgery  Why:  For wound re-check, For suture/staple removal and X-rays of hip  Contact information:  [address] [place] [address] DR Kylah TORRES [id number]  [phone]                 Patient Instructions:     WALKER FOR HOME USE   Order Specific Question Answer Comments   Type of Walker: Adult (5'4"-6'6")    With wheels? Yes    Height: 5' (1.524 m)    Weight: 35.4 kg (78 lb)    Length of need (1-99 months): 99    Does patient have medical equipment at home? walker, rolling    Please check all that apply: Patient's condition impairs ambulation.    Please check all that apply: Patient is unable to safely ambulate without equipment.    Please check all that apply: Patient needs help to get " in and out of chair.    Please check all that apply: Walker will be used for gait training.    Please check all that apply: Altered sensory perception.      Diet general     Call MD for:  temperature >100.4     Call MD for:  persistent nausea and vomiting     Call MD for:  severe uncontrolled pain     Call MD for:  redness, tenderness, or signs of infection (pain, swelling, redness, odor or green/yellow discharge around incision site)     Call MD for:  hives     Weight bearing as tolerated   Order Comments: WBAT on LLE with a roller walker.     Wound care routine (specify)   Order Comments: Wound care routine: Change dressing every 2-3 days using sterile gauze, 4x4s, and tape (silk or microfoam).     Remove staples from skin on postop day #14.     Change dressing (specify)   Order Comments: Dressing change:  Change dressing every 2-3 days using sterile gauze, 4x4s, and tape (silk or microfoam).       Medications:  Reconciled Home Medications:   Current Discharge Medication List      START taking these medications    Details   acetaminophen (TYLENOL) 325 MG tablet Take 2 tablets (650 mg total) by mouth every 6 (six) hours. Take 4 times a day for 10 days and then as needed thereafter.  Refills: 0      aspirin (ECOTRIN) 81 MG EC tablet Take 1 tablet (81 mg total) by mouth once daily. Take for 14 days only.  Refills: 0         CONTINUE these medications which have NOT CHANGED    Details   benzonatate (TESSALON) 200 MG capsule Take 200 mg by mouth 3 (three) times daily as needed for Cough.      donepezil (ARICEPT) 10 MG tablet Take 10 mg by mouth every evening.      mirtazapine (REMERON) 7.5 MG Tab Take 7.5 mg by mouth every evening.      risedronate (ACTONEL) 150 MG Tab Take 150 mg by mouth every 30 days.           Time spent on the discharge of patient: 35 minutes    HOS POC IP DISCHARGE SUMMARY    Jenny Norman NP  Department of Hospital Medicine  [facility]

## 2017-08-09 NOTE — ASSESSMENT & PLAN NOTE
The patient is doing well status post trochanteric femoral nail fixation of her left hip fracture.  Her dressings are dry and intact.  She was needed to work on transfers, bed to chair status, and a relation training weightbearing as tolerated left lower extremity utilizing a roller walker.  She should be ready for transfer back to her nursing home with physical therapy at the nursing home tomorrow if she remains medically stable.    Postop day #2- The patient was asleep in bed. Her dressing was changed this morning with sterile 4x4s and microfoam tape. She will be WBAT on LLE with a roller walker.   The patient can return to her group home later today once she is cleared by hospital medicine. We will see her back in 2 weeks to have her staples removed.

## 2017-08-09 NOTE — PHYSICIAN QUERY
PT Name: Herlinda Tamez  MR #: [id number]    Physician Query Form - Nutrition Clarification     CDS/: Violetta Rice RN               Contact information: [email or web address]    This form is a permanent document in the medical record.     Query Date: August 9, 2017    By submitting this query, we are merely seeking further clarification of documentation.. Please utilize your independent clinical judgment when addressing the question(s) below.    The Medical record contains the following:   Indicators  Supporting Clinical Findings Location in Medical Record    % of Estimated Energy Intake over a time frame from p.o., TF, or TPN      Weight Status over a time frame      Subcutaneous Fat and/or Muscle Loss      Fluid Accumulation or Edema      Reduced  Strength     x Wt / BMI / Usual Body Weight BMI 15.3 8/6 @1603 Vital Signs    Delayed Wound Healing / Failure to Thrive     x Acute or Chronic Illness Dementia,  Closed left hip fracture s/p ground level fall  H&P 8/7    Medication      Treatment     x Other Physical Exam  General:  Malnutrition     Frail and thin.     Constitutional:      She appears well-developed and well-nourished.   Emaciated     Nutrition 2-->probably inadequate.Blanchable redness also noted to protruding Thoracic spine. Patient weighs 78 lbs, and her caregiver at bedside states she doesn't want to eat.    8/7 Anesthesia record      ED Provider 8/6      H&P 8/6 8/8 Wound Care consult     AND / ASPEN Clinical Characteristics (October 2011)  A minimum of two characteristics is recommended for diagnosing either moderate or severe malnutrition   Mild Malnutrition Moderate Malnutrition Severe Malnutrition   Energy Intake from p.o., TF or TPN. < 75% intake of estimated energy needs for less than 7 days < 75% intake of estimated energy needs for greater than 7 days < 50% intake of estimated energy needs for > 5 days   Weight Loss 1-2% in 1 month  5% in 3 months  7.5% in 6  months  10% in 1 year 1-2 % in 1 week  5% in 1 month  7.5% in 3 months  10% in 6 months  20% in 1 year > 2% in 1 week  > 5% in 1 month  > 7.5% in 3 months  > 10% in 6 months  > 20% in 1 year   Physical Findings     None *Mild subcutaneous fat and/or muscle loss  *Mild fluid accumulation  *Stage II decubitus  *Surgical wound or non-healing wound *Mod/severe subcutaneous fat and/or muscle loss  *Mod/severe fluid accumulation  *Stage III or IV decubitus  *Non-healing surgical wound     Provider, please specify diagnosis or diagnoses associated with above clinical findings.    [ ] Mild Protein-Calorie Malnutrition  [ ] Moderate Protein-Calorie Malnutrition  [ ] Severe Protein-Calorie Malnutrition  [x ] Cachexia  [ ] Anorexia  [ ] Abnormal Weight Loss  [ ] Underweight  [ ] Other Nutritional Diagnosis (please specify): ____________________________________  [ ] Other: ________________________________  [ ] Clinically Undetermined    Please document in your progress notes daily for the duration of treatment until resolved and include in your discharge summary.

## 2017-08-09 NOTE — SUBJECTIVE & OBJECTIVE
Principal Problem:Closed fracture of left hip requiring operative repair    Principal Orthopedic Problem: Closed left hip fracture requiring operative repair  Interval History: The patient had an ORIF of left hip fracture on 8/7/17. There were no complications during the surgery. She does have acute blood loss anemia which is expected from surgery. She did not require a blood transfusion. The patient will return to her group home later today.     Review of patient's allergies indicates:  No Known Allergies    Current Facility-Administered Medications   Medication    acetaminophen tablet 650 mg    bisacodyl suppository 10 mg    donepezil tablet 10 mg    enoxaparin injection 40 mg    hydromorphone (PF) injection 0.2 mg    mirtazapine tablet 7.5 mg    mupirocin 2 % ointment 1 g    ondansetron disintegrating tablet 4 mg    ondansetron injection 4 mg    oxycodone immediate release tablet 5 mg    pantoprazole EC tablet 40 mg    polyethylene glycol packet 17 g    sodium chloride 0.9% flush 3 mL     Objective:     Vital Signs (Most Recent):  Temp: 97.8 °F (36.6 °C) (08/09/17 0303)  Pulse: 85 (08/09/17 0303)  Resp: 18 (08/09/17 0303)  BP: 133/61 (08/09/17 0303)  SpO2: 96 % (08/09/17 0303) Vital Signs (24h Range):  Temp:  [97.7 °F (36.5 °C)-98.6 °F (37 °C)] 97.8 °F (36.6 °C)  Pulse:  [77-97] 85  Resp:  [16-18] 18  SpO2:  [95 %-97 %] 96 %  BP: (122-133)/(57-66) 133/61     Weight: 35.4 kg (78 lb)  Height: 5' (152.4 cm)  Body mass index is 15.23 kg/m².      Intake/Output Summary (Last 24 hours) at 08/09/17 0751  Last data filed at 08/09/17 0400   Gross per 24 hour   Intake              350 ml   Output                0 ml   Net              350 ml       General    Constitutional: She appears well-developed and well-nourished.   Neck: Normal range of motion.   Cardiovascular: Normal rate and regular rhythm.    Pulmonary/Chest: Effort normal.   Abdominal: Soft.         Left Hip Exam     Comments:  Incisions are clean,  dry and intact. No evidence of infection. Surgical dressing was changed this morning.             Significant Labs:   BMP:   Recent Labs  Lab 08/08/17  2355   *      K 4.0      CO2 23   BUN 14   CREATININE 0.6   CALCIUM 7.6*     CBC:   Recent Labs  Lab 08/08/17  0458 08/09/17  0502   WBC  --  6.24   HGB 10.1* 9.3*   HCT 30.0* 27.6*   PLT  --  137*     CMP:   Recent Labs  Lab 08/08/17 0458 08/08/17  2355    136   K 3.9 4.0    106   CO2 23 23   GLU 75 115*   BUN 14 14   CREATININE 0.6 0.6   CALCIUM 7.9* 7.6*   ANIONGAP 8 7*   EGFRNONAA >60 >60     All pertinent labs within the past 24 hours have been reviewed.    Significant Imaging: I have reviewed and interpreted all pertinent imaging results/findings.

## 2017-08-09 NOTE — PLAN OF CARE
Problem: Patient Care Overview  Goal: Plan of Care Review  PT REQUIRES MOD A FOR GT X 10' WITH RW WBAT    Outcome: Ongoing (interventions implemented as appropriate)  Bed alarm activated. Personal sitter at bedside. [employer] monitoring during shift. Pt turned Q2 hrs. Heels floated. Blanchable redness to sacral area. Pain controlled well during shift. Pt partially pulled off dressing. Reinforced dressing during shift. Pills crushed and administered in apple sauce. Incontinent care performed during shift. VSS. Will continue to monitor.

## 2017-08-09 NOTE — PROGRESS NOTES
[facility]  Orthopedics  Progress Note    Patient Name: Herlinda Tamez  MRN: [id number]  Admission Date: 8/6/2017  Hospital Length of Stay: 3 days  Attending Provider: Dayanara Cruz MD  Primary Care Provider: Mckay Hurtado MD  Follow-up For: Procedure(s) (LRB):  [id number] (Left)    Post-Operative Day: 2 Days Post-Op  Subjective:     Principal Problem:Closed fracture of left hip requiring operative repair    Principal Orthopedic Problem: Closed left hip fracture requiring operative repair  Interval History: The patient had an ORIF of left hip fracture on 8/7/17. There were no complications during the surgery. She does have acute blood loss anemia which is expected from surgery. She did not require a blood transfusion. The patient will return to her group home later today.     Review of patient's allergies indicates:  No Known Allergies    Current Facility-Administered Medications   Medication    acetaminophen tablet 650 mg    bisacodyl suppository 10 mg    donepezil tablet 10 mg    enoxaparin injection 40 mg    hydromorphone (PF) injection 0.2 mg    mirtazapine tablet 7.5 mg    mupirocin 2 % ointment 1 g    ondansetron disintegrating tablet 4 mg    ondansetron injection 4 mg    oxycodone immediate release tablet 5 mg    pantoprazole EC tablet 40 mg    polyethylene glycol packet 17 g    sodium chloride 0.9% flush 3 mL     Objective:     Vital Signs (Most Recent):  Temp: 97.8 °F (36.6 °C) (08/09/17 0303)  Pulse: 85 (08/09/17 0303)  Resp: 18 (08/09/17 0303)  BP: 133/61 (08/09/17 0303)  SpO2: 96 % (08/09/17 0303) Vital Signs (24h Range):  Temp:  [97.7 °F (36.5 °C)-98.6 °F (37 °C)] 97.8 °F (36.6 °C)  Pulse:  [77-97] 85  Resp:  [16-18] 18  SpO2:  [95 %-97 %] 96 %  BP: (122-133)/(57-66) 133/61     Weight: 35.4 kg (78 lb)  Height: 5' (152.4 cm)  Body mass index is 15.23 kg/m².      Intake/Output Summary (Last 24 hours) at 08/09/17 8918  Last data filed at 08/09/17 6274    Gross per 24 hour   Intake              350 ml   Output                0 ml   Net              350 ml       General    Constitutional: She appears well-developed and well-nourished.   Neck: Normal range of motion.   Cardiovascular: Normal rate and regular rhythm.    Pulmonary/Chest: Effort normal.   Abdominal: Soft.         Left Hip Exam     Comments:  Incisions are clean, dry and intact. No evidence of infection. Surgical dressing was changed this morning.             Significant Labs:   BMP:   Recent Labs  Lab 08/08/17  2355   *      K 4.0      CO2 23   BUN 14   CREATININE 0.6   CALCIUM 7.6*     CBC:   Recent Labs  Lab 08/08/17  0458 08/09/17  0502   WBC  --  6.24   HGB 10.1* 9.3*   HCT 30.0* 27.6*   PLT  --  137*     CMP:   Recent Labs  Lab 08/08/17 0458 08/08/17  2355    136   K 3.9 4.0    106   CO2 23 23   GLU 75 115*   BUN 14 14   CREATININE 0.6 0.6   CALCIUM 7.9* 7.6*   ANIONGAP 8 7*   EGFRNONAA >60 >60     All pertinent labs within the past 24 hours have been reviewed.    Significant Imaging: I have reviewed and interpreted all pertinent imaging results/findings.    Assessment/Plan:     Acute blood loss anemia    We'll recheck an H&H tomorrow morning.        S/P ORIF (open reduction internal fixation) fracture    The patient is doing well status post trochanteric femoral nail fixation of her left hip fracture.  Her dressings are dry and intact.  She was needed to work on transfers, bed to chair status, and a relation training weightbearing as tolerated left lower extremity utilizing a roller walker.  She should be ready for transfer back to her nursing home with physical therapy at the nursing home tomorrow if she remains medically stable.    Postop day #2- The patient was asleep in bed. Her dressing was changed this morning with sterile 4x4s and microfoam tape. She will be WBAT on LLE with a roller walker.   The patient can return to her group home later today once she is  cleared by hospital medicine. We will see her back in 2 weeks to have her staples removed.               Kandy Martínez PA-C  Orthopedics  [facility]

## 2017-08-09 NOTE — PLAN OF CARE
CM requested post op appointment with Dr Tello via [employer].  Patient will return to [facility]      08/09/17 1036   Final Note   Assessment Type Final Discharge Note   Discharge Disposition Home   Hospital Follow Up  Appt(s) scheduled? No  (Message sent to Dr Bales staff via [employer] for 2 week post op visit request)   Referral to Outpatient Case Management complete? n/a   Referral to / orders for Home Health Complete? n/a   30 day supply of medicines given at discharge, if documented non-compliance / non-adherence? n/a   Any social issues identified prior to discharge? n/a   Right Care Referral Info   Post Acute Recommendation No Care   Facility Name PT at [facility]

## 2017-08-09 NOTE — PLAN OF CARE
Problem: Patient Care Overview  Goal: Plan of Care Review  PT REQUIRES MOD A FOR GT X 10' WITH RW WBAT    Outcome: Ongoing (interventions implemented as appropriate)  Patient denies pain. Tolerating diet. Family members or sitter at bedside at all time this shift. 12 hour chart check completed.

## 2017-08-09 NOTE — PROGRESS NOTES
[employer] set up in pt's room to alert nurse if pt attempts to pull on lines or becomes restless. Educated pt and pt's sitter. Called [employer] and spoke with representative and gave phone number.

## 2017-08-11 ENCOUNTER — TELEPHONE (OUTPATIENT)
Dept: ORTHOPEDICS | Facility: CLINIC | Age: 82
End: 2017-08-11

## 2017-08-14 ENCOUNTER — TELEPHONE (OUTPATIENT)
Dept: ORTHOPEDICS | Facility: CLINIC | Age: 82
End: 2017-08-14

## 2017-08-14 DIAGNOSIS — S72.002A CLOSED FRACTURE OF LEFT HIP REQUIRING OPERATIVE REPAIR, INITIAL ENCOUNTER: Primary | ICD-10-CM

## 2017-08-14 NOTE — TELEPHONE ENCOUNTER
----- Message from Joycelyn Pitts sent at 8/14/2017 11:09 AM CDT -----  Contact: Elly - Nurse  Request a call concerning a home health order for the pt, can be reached at [phone]///[id number]  '    Pt's referral for home health has been sent over to [facility].

## 2017-08-22 DIAGNOSIS — M25.552 PAIN OF LEFT HIP JOINT: Primary | ICD-10-CM

## 2017-08-23 ENCOUNTER — HOSPITAL ENCOUNTER (OUTPATIENT)
Dept: RADIOLOGY | Facility: HOSPITAL | Age: 82
Discharge: HOME | End: 2017-08-23
Attending: ORTHOPAEDIC SURGERY
Payer: MEDICARE

## 2017-08-23 ENCOUNTER — OFFICE VISIT (OUTPATIENT)
Dept: ORTHOPEDICS | Facility: CLINIC | Age: 82
End: 2017-08-23
Payer: MEDICARE

## 2017-08-23 VITALS — DIASTOLIC BLOOD PRESSURE: 73 MMHG | RESPIRATION RATE: 12 BRPM | HEART RATE: 77 BPM | SYSTOLIC BLOOD PRESSURE: 131 MMHG

## 2017-08-23 DIAGNOSIS — M25.552 LEFT HIP PAIN: Primary | ICD-10-CM

## 2017-08-23 DIAGNOSIS — M25.552 PAIN OF LEFT HIP JOINT: ICD-10-CM

## 2017-08-23 DIAGNOSIS — S72.002D: Primary | ICD-10-CM

## 2017-08-23 PROCEDURE — 99212 OFFICE O/P EST SF 10 MIN: CPT | Mod: PBBFAC,25 | Performed by: ORTHOPAEDIC SURGERY

## 2017-08-23 PROCEDURE — 73502 X-RAY EXAM HIP UNI 2-3 VIEWS: CPT | Mod: 26,LT,, | Performed by: RADIOLOGY

## 2017-08-23 PROCEDURE — 99999 PR PBB SHADOW E&M-EST. PATIENT-LVL II: CPT | Mod: PBBFAC,,, | Performed by: ORTHOPAEDIC SURGERY

## 2017-08-23 PROCEDURE — 99024 POSTOP FOLLOW-UP VISIT: CPT | Mod: ,,, | Performed by: ORTHOPAEDIC SURGERY

## 2017-08-23 RX ORDER — MIRTAZAPINE 15 MG/1
TABLET, FILM COATED ORAL
COMMUNITY
Start: 2017-08-07

## 2017-09-25 ENCOUNTER — HOSPITAL ENCOUNTER (EMERGENCY)
Facility: HOSPITAL | Age: 82
Discharge: HOME | End: 2017-09-25
Attending: EMERGENCY MEDICINE
Payer: MEDICARE

## 2017-09-25 VITALS
HEIGHT: 60 IN | SYSTOLIC BLOOD PRESSURE: 123 MMHG | OXYGEN SATURATION: 100 % | WEIGHT: 77 LBS | DIASTOLIC BLOOD PRESSURE: 63 MMHG | HEART RATE: 74 BPM | BODY MASS INDEX: 15.12 KG/M2 | RESPIRATION RATE: 18 BRPM | TEMPERATURE: 98 F

## 2017-09-25 DIAGNOSIS — S09.90XA HEAD INJURY, INITIAL ENCOUNTER: ICD-10-CM

## 2017-09-25 DIAGNOSIS — T14.90XA INJURY: ICD-10-CM
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
## 2017-09-25 DIAGNOSIS — M54.6 THORACIC SPINE PAIN: ICD-10-CM

## 2017-09-25 PROCEDURE — 99284 EMERGENCY DEPT VISIT MOD MDM: CPT

## 2017-09-25 NOTE — ED PROVIDER NOTES
SCRIBE #1 NOTE: I, Colin Carpio, am scribing for, and in the presence of, Casa Fragoso Jr., MD. I have scribed the entire note.      History      Chief Complaint   Patient presents with    Fall     fell in the shower earlier today at nursing home. family took her to [facility]- they sent her here bc they could not rule out new wedge fracture. also stated that she may need head CT- denies LOC. denies dizziness. history of dementia        Review of patient's allergies indicates:   Allergen Reactions    Codeine     Sulfa (sulfonamide antibiotics)         HPI   HPI    9/25/2017, 1:10 PM   History obtained from the Family at bedside      History of Present Illness: Herlinda Tamez is a 89 y.o. female patient who presents to the Emergency Department for further evaluation of a contusion to her scalp. Pt was seen at the urgent care in [place] for further evaluation of pt hitting the back of her head in the shower this morning at the nursing home. Family states they x-rayed her and then sent her here for further evaluation to rule out a possible fracture and obtain a head CT. Pt has a Hx of dementia. Pt has no complaints at this time. Limited ROS secondary to dementia. No further complaints or concerns at this time.     Arrival mode: Personal vehicle     PCP: Mckay Hurtado MD       Past Medical History:  Past Medical History:   Diagnosis Date    Dementia     Fractures     Osteoporosis        Past Surgical History:  Past Surgical History:   Procedure Laterality Date    fracture repair      fracture tailbone reparied    HUMERUS FRACTURE SURGERY Left 08/07/2017         Family History:  Family History   Problem Relation Age of Onset    Family history unknown: Yes       Social History:  Social History     Social History Main Topics    Smoking status: Never Smoker    Smokeless tobacco: Never Used    Alcohol use No    Drug use: No    Sexual activity: Not on file       ROS   Review of Systems   Unable to  perform ROS: Dementia       Physical Exam      Initial Vitals [09/25/17 1225]   BP Pulse Resp Temp SpO2   135/77 93 18 97.4 °F (36.3 °C) 98 %      MAP       96.33          Physical Exam  Nursing Notes and Vital Signs Reviewed.  Constitutional: Patient is in no apparent distress. Elderly and frail.   Head: Normocephalic. Small hematoma noted to the top of the scalp.   Eyes: PERRL. EOM intact. Conjunctivae are not pale. No scleral icterus.  ENT: Mucous membranes are moist. Oropharynx is clear and symmetric.    Neck: Supple. Full ROM. No lymphadenopathy. No cervical midline bony tenderness, deformities, or step-offs.   Cardiovascular: Regular rate. Regular rhythm. No murmurs, rubs, or gallops. Distal pulses are 2+ and symmetric.  Pulmonary/Chest: No respiratory distress. Clear to auscultation bilaterally. No wheezing, rales, or rhonchi.  Abdominal: Soft and non-distended.  There is no tenderness.  No rebound, guarding, or rigidity. Good bowel sounds.  Musculoskeletal: Moves all extremities. No obvious deformities. No edema. No calf tenderness.  Back: No tenderness. No midline bony tenderness, deformities, or step-offs of the T-spine or L-spine. Skin appears normal without abrasions or bruising. No erythema, induration, or fluctuance. Kyphotic.   Skin: Warm and dry.  Neurological:  Alert, awake, and appropriate.  Normal speech.  No acute focal neurological deficits are appreciated.  Psychiatric: Normal affect. Good eye contact. Appropriate in content.    ED Course    Procedures  ED Vital Signs:  Vitals:    09/25/17 1225 09/25/17 1432 09/25/17 1435   BP: 135/77 123/63    Pulse: 93 74    Resp: 18     Temp: 97.4 °F (36.3 °C)  97.9 °F (36.6 °C)   TempSrc: Oral     SpO2: 98% 100%    Weight: 34.9 kg (77 lb)     Height: 5' (1.524 m)         Abnormal Lab Results:  Labs Reviewed - No data to display     All Lab Results:  None    Imaging Results:  Imaging Results          CT Thoracic Spine Without Contrast (Final result)  Result  time 09/25/17 14:53:42   Procedure changed from CT Thorax Without Contrast     Final result by PEEWEE Tapia Sr., MD (09/25/17 14:53:42)                 Impression:      1. This is a limited examination secondary to patient motion.  2. There are healing fractures in the posterior aspect of the right 10th and 11th ribs. There are healing fractures in the anterolateral aspect of the left third, fourth, fifth, and sixth ribs. There are healing fractures in the posterior aspect of the left 10th through 12th ribs.  3. There are old appearing marked compression fractures of T9 and T10. There are old appearing moderate compression fractures of T7 and T11.   4. There is exaggeration of the normal thoracic kyphosis.      All CT scans at this facility use dose modulation, iterative reconstruction, and/or weight base dosing when appropriate to reduce radiation dose when appropriate to reduce radiation dose to as low as reasonably achievable.      Electronically signed by: PEEWEE TAPIA MD  Date:     09/25/17  Time:    14:53              Narrative:    CT of thoracic spine without IV contrast    History: Injury, unspecified; suspected fractures    Technique: Standard thoracic spine CT protocol was performed without IV contrast.    Finding: This is a limited examination secondary to patient motion. There are healing fractures in the posterior aspect of the right 10th and 11th ribs. There are healing fractures in the anterolateral aspect of the left third, fourth, fifth, and sixth ribs. There are healing fractures in the posterior aspect of the left 10th through 12th ribs.    The following pertains to be thoracic spine. There are old appearing marked compression fractures of T9 and T10. There are old appearing moderate compression fractures of T7 and T11. There is no spondylolisthesis or scoliosis. There is exaggeration of the normal thoracic kyphosis. There is a mild amount of atherosclerosis. There are mild dependent atelectatic  changes in both lungs.                             CT Cervical Spine Without Contrast (Final result)  Result time 09/25/17 14:38:10    Final result by PEEWEE Tapia Sr., MD (09/25/17 14:38:10)                 Impression:      1. There is grade 1 anterolisthesis of C6 on C7. There is grade 1 anterolisthesis of C7 on T1. There is grade 1 anterolisthesis of T1 on T2.  2. There are mild degenerative changes between C4 and C7. There are mild osteoarthritic changes in the facet joints bilaterally between C4 and T1.   3. There is exaggeration of the normal cervical lordosis.   4. The thyroid is heterogeneous in appearance. If additional imaging evaluation is clinically indicated, I recommend consideration of a dedicated ultrasound examination of the thyroid.  5. There is a mild amount of atherosclerosis.      All CT scans at this facility use dose modulation, iterative reconstruction, and/or weight base dosing when appropriate to reduce radiation dose when appropriate to reduce radiation dose to as low as reasonably achievable.      Electronically signed by: PEEWEE TAPIA MD  Date:     09/25/17  Time:    14:38              Narrative:    CT of c-spine without IV contrast    History: Dementia; recent fall    Technique: Standard cervical spine CT protocol was performed without IV contrast.    Finding: There is grade 1 anterolisthesis of C6 on C7. There is grade 1 anterolisthesis of C7 on T1. There is grade 1 anterolisthesis of T1 on T2. There is no fracture or scoliosis. There are mild degenerative changes between C4 and C7. There are mild osteoarthritic changes in the facet joints bilaterally between C4 and T1. There is exaggeration of the normal cervical lordosis. The thyroid is heterogeneous in appearance. There is a mild amount of atherosclerosis.                             CT Head Without Contrast (Final result)  Result time 09/25/17 14:12:30    Final result by Clem Rodrigez MD (09/25/17 14:12:30)                  Impression:       Moderate generalized age-related atrophy. No acute findings.        All CT scans at this facility use dose modulation, iterative reconstruction and/or weight based dosing when appropriate to reduce radiation dose to as low as reasonably achievable.       Electronically signed by: NIMISHA BURGOS MD  Date:     09/25/17  Time:    14:12              Narrative:    CT HEAD WITHOUT CONTRAST     History:  Head injury with headache after a fall.    Technique:  Noncontrast CT of the brain.     Comparison: 08/06/2017    Findings:  The ventricles are dilated consistent with generalized atrophy. Associated age-related white matter degeneration is present.     No significant acute findings are noted.                                      The Emergency Provider reviewed the vital signs and test results, which are outlined above.    ED Discussion     3:01 PM: Reassessed pt at this time. Pt is laying comfortably in ED bed and in NAD. Pt is awake, alert, and oriented. Discussed with pt all pertinent ED information and results. Discussed pt dx and plan of tx. Gave pt all f/u and return to the ED instructions. All questions and concerns were addressed at this time. Pt expresses understanding of information and instructions, and is comfortable with plan to discharge. Pt is stable for discharge.    I discussed with patient and/or family/caretaker that evaluation in the ED does not suggest any emergent or life threatening medical conditions requiring immediate intervention beyond what was provided in the ED, and I believe patient is safe for discharge.  Regardless, an unremarkable evaluation in the ED does not preclude the development or presence of a serious of life threatening condition. As such, patient was instructed to return immediately for any worsening or change in current symptoms.      ED Medication(s):  Medications - No data to display    Discharge Medication List as of 9/25/2017  2:57 PM          Follow-up  Information     Mckay Hurtado MD. Call in 2 days.    Specialty:  Internal Medicine  Contact information:  [address] [facility] [address] [phone]                     Medical Decision Making    Medical Decision Making:   Clinical Tests:   Radiological Study: Ordered and Reviewed           Scribe Attestation:   Scribe #1: I performed the above scribed service and the documentation accurately describes the services I performed. I attest to the accuracy of the note.    Attending:   Physician Attestation Statement for Scribe #1: I, Casa Fragoso Jr., MD, personally performed the services described in this documentation, as scribed by Colin Carpio, in my presence, and it is both accurate and complete.          Clinical Impression       ICD-10-CM ICD-9-CM   1. Fall, initial encounter W19.XXXA E888.9   2. Injury T14.90 959.9   3. Head injury, initial encounter S09.90XA 959.01   4. Thoracic spine pain M54.6 724.1       Disposition:   Disposition: Discharged  Condition: Stable         Casa Fragoso Jr., MD  09/25/17 9169

## 2017-10-03 DIAGNOSIS — M25.552 PAIN OF LEFT HIP JOINT: Primary | ICD-10-CM

## 2017-10-04 ENCOUNTER — HOSPITAL ENCOUNTER (OUTPATIENT)
Dept: RADIOLOGY | Facility: HOSPITAL | Age: 82
Discharge: HOME | End: 2017-10-04
Attending: ORTHOPAEDIC SURGERY
Payer: MEDICARE

## 2017-10-04 ENCOUNTER — OFFICE VISIT (OUTPATIENT)
Dept: ORTHOPEDICS | Facility: CLINIC | Age: 82
End: 2017-10-04
Payer: MEDICARE

## 2017-10-04 VITALS
RESPIRATION RATE: 12 BRPM | WEIGHT: 76.94 LBS | SYSTOLIC BLOOD PRESSURE: 134 MMHG | BODY MASS INDEX: 15.11 KG/M2 | DIASTOLIC BLOOD PRESSURE: 89 MMHG | HEART RATE: 98 BPM | HEIGHT: 60 IN

## 2017-10-04 DIAGNOSIS — S72.002D CLOSED FRACTURE OF LEFT HIP REQUIRING OPERATIVE REPAIR WITH ROUTINE HEALING, SUBSEQUENT ENCOUNTER: Primary | ICD-10-CM

## 2017-10-04 DIAGNOSIS — M25.552 PAIN OF LEFT HIP JOINT: ICD-10-CM

## 2017-10-04 PROCEDURE — 73502 X-RAY EXAM HIP UNI 2-3 VIEWS: CPT | Mod: 26,LT,, | Performed by: RADIOLOGY

## 2017-10-04 PROCEDURE — 99999 PR PBB SHADOW E&M-EST. PATIENT-LVL III: CPT | Mod: PBBFAC,,, | Performed by: ORTHOPAEDIC SURGERY

## 2017-10-04 PROCEDURE — 73502 X-RAY EXAM HIP UNI 2-3 VIEWS: CPT | Mod: TC,LT

## 2017-10-04 PROCEDURE — 99213 OFFICE O/P EST LOW 20 MIN: CPT | Mod: PBBFAC,25 | Performed by: ORTHOPAEDIC SURGERY

## 2017-10-04 PROCEDURE — 99024 POSTOP FOLLOW-UP VISIT: CPT | Mod: ,,, | Performed by: ORTHOPAEDIC SURGERY

## 2017-10-04 RX ORDER — DONEPEZIL HYDROCHLORIDE 10 MG/1
TABLET, FILM COATED ORAL
COMMUNITY
Start: 2017-09-26

## 2017-10-04 NOTE — PROGRESS NOTES
Subjective:     Patient ID: Herlinda Tamez is a 89 y.o. female.    Chief Complaint: Post-op Evaluation and Pain of the Left Hip    HPI:   The patient is seen status post cephalo-medullary nail fixation of a left intertrochanteric hip fracture August 7, 2017.  Her hip is feeling well. She is ambulating,  weightbearing as tolerated. She is having no pain.  Family History   Problem Relation Age of Onset    Family history unknown: Yes     Past Medical History:   Diagnosis Date    Dementia     Fractures     Osteoporosis      Social History     Social History    Marital status:      Spouse name: N/A    Number of children: N/A    Years of education: N/A     Social History Main Topics    Smoking status: Never Smoker    Smokeless tobacco: Never Used    Alcohol use No    Drug use: No    Sexual activity: Not Asked     Other Topics Concern    None     Social History Narrative    None     Past Surgical History:   Procedure Laterality Date    fracture repair      fracture tailbone reparied    HUMERUS FRACTURE SURGERY Left 08/07/2017     Review of patient's allergies indicates:   Allergen Reactions    Codeine     Sulfa (sulfonamide antibiotics)          Medication List with Changes/Refills   Current Medications    ACETAMINOPHEN (TYLENOL) 325 MG TABLET    Take 2 tablets (650 mg total) by mouth every 6 (six) hours. Take 4 times a day for 10 days and then as needed thereafter.    ASPIRIN (ECOTRIN) 81 MG EC TABLET    Take 1 tablet (81 mg total) by mouth once daily. Take for 14 days only.    BENZONATATE (TESSALON) 200 MG CAPSULE    Take 200 mg by mouth 3 (three) times daily as needed for Cough.    DONEPEZIL (ARICEPT) 10 MG TABLET    Take 10 mg by mouth every evening.    DONEPEZIL (ARICEPT) 10 MG TABLET    TAKE 1 TABLET BY MOUTH AT BEDTIME    MIRTAZAPINE (REMERON) 15 MG TABLET        RISEDRONATE (ACTONEL) 150 MG TAB    Take 150 mg by mouth every 30 days.       ROS    Objective:   /89   Pulse 98   Resp  12   Ht 5' (1.524 m)   Wt 34.9 kg (76 lb 15.1 oz)   BMI 15.03 kg/m²       Ortho/SPM Exam Incisions are well healed.  Hip is painfree. She is walking well with a walker.    X-RAY:  X-rays of the left hip show satisfactory position, alignment, and healingt of her  Fracture.    Assessment:         Encounter Diagnosis   Name Primary?    Closed fracture of left hip requiring operative repair with routine healing, subsequent encounter Yes          Plan:      She will be seen back PRN if she has any questions or concerns.       Disclaimer: This note is generated with speech recognition software and is subject to transcription error and sound alike phrases that may be missed by proofreading.

## 2017-11-28 ENCOUNTER — HOSPITAL ENCOUNTER (EMERGENCY)
Facility: HOSPITAL | Age: 82
Discharge: HOME | End: 2017-11-29
Attending: EMERGENCY MEDICINE
Payer: MEDICARE

## 2017-11-28 ENCOUNTER — HOSPITAL ENCOUNTER (OUTPATIENT)
Dept: RADIOLOGY | Facility: HOSPITAL | Age: 82
Discharge: HOME | End: 2017-11-28
Attending: FAMILY MEDICINE
Payer: MEDICARE

## 2017-11-28 DIAGNOSIS — I95.9 HYPOTENSION, UNSPECIFIED HYPOTENSION TYPE: ICD-10-CM

## 2017-11-28 DIAGNOSIS — R68.89 DECREASED ACTIVITY: ICD-10-CM

## 2017-11-28 DIAGNOSIS — G30.1 LATE ONSET ALZHEIMER'S DISEASE WITHOUT BEHAVIORAL DISTURBANCE: Chronic | ICD-10-CM

## 2017-11-28 DIAGNOSIS — E86.0 DEHYDRATION: Primary | ICD-10-CM

## 2017-11-28 DIAGNOSIS — R05.9 COUGH: ICD-10-CM

## 2017-11-28 DIAGNOSIS — F02.80 LATE ONSET ALZHEIMER'S DISEASE WITHOUT BEHAVIORAL DISTURBANCE: Chronic | ICD-10-CM

## 2017-11-28 DIAGNOSIS — R41.82 ALTERED MENTAL STATUS: ICD-10-CM

## 2017-11-28 PROBLEM — M81.0 AGE-RELATED OSTEOPOROSIS WITHOUT CURRENT PATHOLOGICAL FRACTURE: Status: ACTIVE | Noted: 2017-11-28

## 2017-11-28 LAB
ALBUMIN SERPL BCP-MCNC: 2.9 G/DL
ALP SERPL-CCNC: 73 U/L
ALT SERPL W/O P-5'-P-CCNC: 23 U/L
ANION GAP SERPL CALC-SCNC: 10 MMOL/L
APTT BLDCRRT: 34.1 SEC
AST SERPL-CCNC: 29 U/L
BACTERIA #/AREA URNS HPF: ABNORMAL /HPF
BASOPHILS # BLD AUTO: 0 K/UL
BASOPHILS NFR BLD: 0 %
BILIRUB SERPL-MCNC: 0.5 MG/DL
BILIRUB UR QL STRIP: NEGATIVE
BNP SERPL-MCNC: 130 PG/ML
BUN SERPL-MCNC: 25 MG/DL
CALCIUM SERPL-MCNC: 7.8 MG/DL
CHLORIDE SERPL-SCNC: 106 MMOL/L
CLARITY UR: ABNORMAL
CO2 SERPL-SCNC: 23 MMOL/L
COLOR UR: YELLOW
CREAT SERPL-MCNC: 0.7 MG/DL
DIFFERENTIAL METHOD: ABNORMAL
EOSINOPHIL # BLD AUTO: 0 K/UL
EOSINOPHIL NFR BLD: 0 %
ERYTHROCYTE [DISTWIDTH] IN BLOOD BY AUTOMATED COUNT: 13.5 %
EST. GFR  (AFRICAN AMERICAN): >60 ML/MIN/1.73 M^2
EST. GFR  (NON AFRICAN AMERICAN): >60 ML/MIN/1.73 M^2
FLUAV AG SPEC QL IA: NEGATIVE
FLUBV AG SPEC QL IA: NEGATIVE
GLUCOSE SERPL-MCNC: 84 MG/DL
GLUCOSE UR QL STRIP: NEGATIVE
GRAN CASTS #/AREA URNS LPF: 1 /LPF
HCT VFR BLD AUTO: 39.4 %
HGB BLD-MCNC: 13 G/DL
HGB UR QL STRIP: ABNORMAL
HYALINE CASTS #/AREA URNS LPF: 1 /LPF
INR PPP: 1.1
KETONES UR QL STRIP: ABNORMAL
LACTATE SERPL-SCNC: 2.4 MMOL/L
LEUKOCYTE ESTERASE UR QL STRIP: NEGATIVE
LIPASE SERPL-CCNC: 33 U/L
LYMPHOCYTES # BLD AUTO: 1.2 K/UL
LYMPHOCYTES NFR BLD: 15.1 %
MAGNESIUM SERPL-MCNC: 1.7 MG/DL
MCH RBC QN AUTO: 32.5 PG
MCHC RBC AUTO-ENTMCNC: 33 G/DL
MCV RBC AUTO: 99 FL
MICROSCOPIC COMMENT: ABNORMAL
MONOCYTES # BLD AUTO: 0.5 K/UL
MONOCYTES NFR BLD: 6.5 %
NEUTROPHILS # BLD AUTO: 6 K/UL
NEUTROPHILS NFR BLD: 78.4 %
NITRITE UR QL STRIP: NEGATIVE
PH UR STRIP: 6 [PH] (ref 5–8)
PHOSPHATE SERPL-MCNC: 3.6 MG/DL
PLATELET # BLD AUTO: 99 K/UL
PMV BLD AUTO: 11 FL
POTASSIUM SERPL-SCNC: 4.4 MMOL/L
PROT SERPL-MCNC: 6.5 G/DL
PROT UR QL STRIP: ABNORMAL
PROTHROMBIN TIME: 11.8 SEC
RBC # BLD AUTO: 4 M/UL
RBC #/AREA URNS HPF: 2 /HPF (ref 0–4)
SODIUM SERPL-SCNC: 139 MMOL/L
SP GR UR STRIP: >=1.03 (ref 1–1.03)
SPECIMEN SOURCE: NORMAL
TSH SERPL DL<=0.005 MIU/L-ACNC: 0.77 UIU/ML
URN SPEC COLLECT METH UR: ABNORMAL
UROBILINOGEN UR STRIP-ACNC: NEGATIVE EU/DL
WBC # BLD AUTO: 7.64 K/UL
WBC #/AREA URNS HPF: 1 /HPF (ref 0–5)

## 2017-11-28 PROCEDURE — 83880 ASSAY OF NATRIURETIC PEPTIDE: CPT

## 2017-11-28 PROCEDURE — 84145 PROCALCITONIN (PCT): CPT

## 2017-11-28 PROCEDURE — 25000003 PHARM REV CODE 250: Performed by: EMERGENCY MEDICINE

## 2017-11-28 PROCEDURE — 71020 XR CHEST PA AND LATERAL: CPT | Mod: 26,,, | Performed by: RADIOLOGY

## 2017-11-28 PROCEDURE — 83735 ASSAY OF MAGNESIUM: CPT

## 2017-11-28 PROCEDURE — 71020 XR CHEST PA AND LATERAL: CPT | Mod: TC

## 2017-11-28 PROCEDURE — 80053 COMPREHEN METABOLIC PANEL: CPT

## 2017-11-28 PROCEDURE — 84443 ASSAY THYROID STIM HORMONE: CPT

## 2017-11-28 PROCEDURE — 99285 EMERGENCY DEPT VISIT HI MDM: CPT | Mod: 25

## 2017-11-28 PROCEDURE — 85730 THROMBOPLASTIN TIME PARTIAL: CPT

## 2017-11-28 PROCEDURE — 93010 ELECTROCARDIOGRAM REPORT: CPT | Mod: ,,, | Performed by: INTERNAL MEDICINE

## 2017-11-28 PROCEDURE — 85610 PROTHROMBIN TIME: CPT

## 2017-11-28 PROCEDURE — 81000 URINALYSIS NONAUTO W/SCOPE: CPT

## 2017-11-28 PROCEDURE — 36000 PLACE NEEDLE IN VEIN: CPT

## 2017-11-28 PROCEDURE — 85025 COMPLETE CBC W/AUTO DIFF WBC: CPT

## 2017-11-28 PROCEDURE — 83605 ASSAY OF LACTIC ACID: CPT

## 2017-11-28 PROCEDURE — 87086 URINE CULTURE/COLONY COUNT: CPT

## 2017-11-28 PROCEDURE — 87040 BLOOD CULTURE FOR BACTERIA: CPT

## 2017-11-28 PROCEDURE — 83690 ASSAY OF LIPASE: CPT

## 2017-11-28 PROCEDURE — 87400 INFLUENZA A/B EACH AG IA: CPT

## 2017-11-28 PROCEDURE — 84100 ASSAY OF PHOSPHORUS: CPT

## 2017-11-28 RX ORDER — SODIUM CHLORIDE 9 MG/ML
1000 INJECTION, SOLUTION INTRAVENOUS
Status: COMPLETED | OUTPATIENT
Start: 2017-11-28 | End: 2017-11-28

## 2017-11-28 RX ADMIN — SODIUM CHLORIDE 1000 ML: 0.9 INJECTION, SOLUTION INTRAVENOUS at 10:11

## 2017-11-29 VITALS
SYSTOLIC BLOOD PRESSURE: 131 MMHG | BODY MASS INDEX: 15.04 KG/M2 | DIASTOLIC BLOOD PRESSURE: 67 MMHG | WEIGHT: 77 LBS | HEART RATE: 73 BPM | RESPIRATION RATE: 19 BRPM | OXYGEN SATURATION: 98 %

## 2017-11-29 PROBLEM — R41.82 ALTERED MENTAL STATUS: Status: ACTIVE | Noted: 2017-11-29

## 2017-11-29 PROBLEM — E86.0 DEHYDRATION: Status: ACTIVE | Noted: 2017-11-29

## 2017-11-29 PROBLEM — I95.9 HYPOTENSION: Status: ACTIVE | Noted: 2017-11-29

## 2017-11-29 PROBLEM — R68.89 DECREASED ACTIVITY: Status: ACTIVE | Noted: 2017-11-29

## 2017-11-29 LAB
LACTATE SERPL-SCNC: 1.3 MMOL/L
PROCALCITONIN SERPL IA-MCNC: 1.35 NG/ML

## 2017-11-29 PROCEDURE — 83605 ASSAY OF LACTIC ACID: CPT

## 2017-11-29 PROCEDURE — 87040 BLOOD CULTURE FOR BACTERIA: CPT

## 2017-11-29 NOTE — ED PROVIDER NOTES
SCRIBE #1 NOTE: I, Adrianne Isabel, am scribing for, and in the presence of, Jovan Arvizu Jr., MD. I have scribed the HPI, ROS, and PEx.     SCRIBE #2 NOTE: I, Michelle Jordan, am scribing for, and in the presence of,  Benny Hernandez MD. I have scribed the remaining portions of the note not scribed by Scribe #1.     History      Chief Complaint   Patient presents with    Altered Mental Status     progressive since yesterday.  From group home       Review of patient's allergies indicates:   Allergen Reactions    Codeine     Sulfa (sulfonamide antibiotics)         HPI   HPI     11/28/2017, 8:10 PM   History obtained from the EMS, niece and patient      History of Present Illness: Herlinda Tamez is a 89 y.o. female patient with PMHx of dementia who presents to the Emergency Department for altered mental status which onset gradually today. Pt is a resident at [facility]. Niece denies any recent fall/injury. Per niece, pt was evaluated by her PCP, Dr. Lincoln, today and had negative chest x-ray. Niece states blood work did not return. Per niece, pt uses a walker to ambulate. EMS states nursing home staff states pt was able to ambulate with a walker yesterday, but not today. Symptoms are constant and moderate in severity. Niece describes the sxs as decreased activity. Niece reports associated sxs include decreased appetite and cough. Pt denies dysuria. History limited secondary to mental status change. No further complaints or concerns at this time.     Arrival mode: AASI    PCP: Hernán Lincoln MD       Past Medical History:  Past Medical History:   Diagnosis Date    Dementia     Fractures     Osteoporosis        Past Surgical History:  Past Surgical History:   Procedure Laterality Date    fracture repair      fracture tailbone reparied    HUMERUS FRACTURE SURGERY Left 08/07/2017         Family History:  Family History   Problem Relation Age of Onset    Family history unknown: Yes       Social History:  Social  History     Social History Main Topics    Smoking status: Never Smoker    Smokeless tobacco: Never Used    Alcohol use No    Drug use: No    Sexual activity: Unknown       ROS   Review of Systems   Unable to perform ROS: Mental status change       Physical Exam      Initial Vitals   BP Pulse Resp Temp SpO2   11/28/17 2000 11/28/17 2000 11/28/17 2000 -- 11/28/17 2330   126/73 77 16  98 %      MAP       11/28/17 2000       90.67          Physical Exam  Nursing Notes and Vital Signs Reviewed.  Constitutional: Patient is in no acute distress. Well-developed and well-nourished.  Head: Atraumatic. Normocephalic.  Eyes: PERRL. EOM intact. Conjunctivae are not pale. No scleral icterus.  ENT: Mucous membranes are moist. Oropharynx is clear and symmetric.    Neck: Supple. Full ROM. No lymphadenopathy.  Cardiovascular: Regular rate. Regular rhythm. No murmurs, rubs, or gallops. Distal pulses are 2+ and symmetric.  Pulmonary/Chest: No respiratory distress. Clear to auscultation bilaterally. No wheezing or rales.  Abdominal: Soft and non-distended.  There is no tenderness.  No rebound, guarding, or rigidity. Good bowel sounds.  Genitourinary: No CVA tenderness  Musculoskeletal: Moves all extremities. No obvious deformities. No edema. No calf tenderness.  Skin: Warm and dry.  Neurological:  Alert, oriented to person.  Normal speech.  No acute focal neurological deficits are appreciated.  Psychiatric: Normal affect. Good eye contact. Appropriate in content.    ED Course    Critical Care  Date/Time: 11/29/2017 1:54 AM  Performed by: AINSLEY CANELA JR  Authorized by: AINSLEY CANELA JR   Direct patient critical care time: 8 minutes  Additional history critical care time: 8 minutes  Ordering / reviewing critical care time: 8 minutes  Documentation critical care time: 7 minutes  Consulting other physicians critical care time: 7 minutes  Consult with family critical care time: 7 minutes  Total critical care time (exclusive of  procedural time) : 45 minutes  Critical care time was exclusive of separately billable procedures and treating other patients.  Critical care was necessary to treat or prevent imminent or life-threatening deterioration of the following conditions: circulatory failure.  Critical care was time spent personally by me on the following activities: blood draw for specimens, development of treatment plan with patient or surrogate, discussions with consultants, interpretation of cardiac output measurements, obtaining history from patient or surrogate, examination of patient, evaluation of patient's response to treatment, ordering and performing treatments and interventions, ordering and review of laboratory studies, ordering and review of radiographic studies, pulse oximetry, re-evaluation of patient's condition and review of old charts.        ED Vital Signs:  Vitals:    11/28/17 2000 11/28/17 2321 11/28/17 2330 11/28/17 2345   BP: 126/73  137/80 137/80   Pulse: 77  73 70   Resp: 16  18 17   SpO2:   98% 97%   Weight:  34.9 kg (77 lb)      11/29/17 0115 11/29/17 0145 11/29/17 0245 11/29/17 0415   BP: (!) 179/69 121/62 120/67 131/67   Pulse: 83 78 72 73   Resp: 20 18 (!) 21 19   SpO2: 98% 96% (!) 94% 98%   Weight:           Abnormal Lab Results:  Labs Reviewed   URINALYSIS - Abnormal; Notable for the following:        Result Value    Appearance, UA Hazy (*)     Specific Gravity, UA >=1.030 (*)     Protein, UA 1+ (*)     Ketones, UA Trace (*)     Occult Blood UA Trace (*)     All other components within normal limits   B-TYPE NATRIURETIC PEPTIDE - Abnormal; Notable for the following:      (*)     All other components within normal limits   CBC W/ AUTO DIFFERENTIAL - Abnormal; Notable for the following:     MCV 99 (*)     MCH 32.5 (*)     Platelets 99 (*)     Gran% 78.4 (*)     Lymph% 15.1 (*)     All other components within normal limits   LACTIC ACID, PLASMA - Abnormal; Notable for the following:     Lactate (Lactic  Acid) 2.4 (*)     All other components within normal limits   COMPREHENSIVE METABOLIC PANEL - Abnormal; Notable for the following:     Calcium 7.8 (*)     Albumin 2.9 (*)     All other components within normal limits   APTT - Abnormal; Notable for the following:     aPTT 34.1 (*)     All other components within normal limits   URINALYSIS MICROSCOPIC - Abnormal; Notable for the following:     Granular Casts, UA 1 (*)     All other components within normal limits   CULTURE, BLOOD   CULTURE, BLOOD   CULTURE, URINE   INFLUENZA A AND B ANTIGEN   PROTIME-INR   LIPASE   MAGNESIUM   PHOSPHORUS   TSH   LACTIC ACID, PLASMA   PROCALCITONIN   LACTIC ACID, PLASMA   LACTIC ACID, PLASMA        All Lab Results:  Results for orders placed or performed during the hospital encounter of 11/28/17   Urinalysis   Result Value Ref Range    Specimen UA Urine, Catheterized     Color, UA Yellow Yellow, Straw, Amirah    Appearance, UA Hazy (A) Clear    pH, UA 6.0 5.0 - 8.0    Specific Gravity, UA >=1.030 (A) 1.005 - 1.030    Protein, UA 1+ (A) Negative    Glucose, UA Negative Negative    Ketones, UA Trace (A) Negative    Bilirubin (UA) Negative Negative    Occult Blood UA Trace (A) Negative    Nitrite, UA Negative Negative    Urobilinogen, UA Negative <2.0 EU/dL    Leukocytes, UA Negative Negative   Influenza antigen Nasopharyngeal Swab   Result Value Ref Range    Influenza A Ag, EIA Negative Negative    Influenza B Ag, EIA Negative Negative    Flu A & B Source Nasopharyngeal Swab    Brain natriuretic peptide   Result Value Ref Range     (H) 0 - 99 pg/mL   CBC auto differential   Result Value Ref Range    WBC 7.64 3.90 - 12.70 K/uL    RBC 4.00 4.00 - 5.40 M/uL    Hemoglobin 13.0 12.0 - 16.0 g/dL    Hematocrit 39.4 37.0 - 48.5 %    MCV 99 (H) 82 - 98 fL    MCH 32.5 (H) 27.0 - 31.0 pg    MCHC 33.0 32.0 - 36.0 g/dL    RDW 13.5 11.5 - 14.5 %    Platelets 99 (L) 150 - 350 K/uL    MPV 11.0 9.2 - 12.9 fL    Gran # 6.0 1.8 - 7.7 K/uL    Lymph #  1.2 1.0 - 4.8 K/uL    Mono # 0.5 0.3 - 1.0 K/uL    Eos # 0.0 0.0 - 0.5 K/uL    Baso # 0.00 0.00 - 0.20 K/uL    Gran% 78.4 (H) 38.0 - 73.0 %    Lymph% 15.1 (L) 18.0 - 48.0 %    Mono% 6.5 4.0 - 15.0 %    Eosinophil% 0.0 0.0 - 8.0 %    Basophil% 0.0 0.0 - 1.9 %    Differential Method Automated    Protime-INR   Result Value Ref Range    Prothrombin Time 11.8 9.0 - 12.5 sec    INR 1.1 0.8 - 1.2   Lactic acid, plasma   Result Value Ref Range    Lactate (Lactic Acid) 2.4 (H) 0.5 - 2.2 mmol/L   Comprehensive metabolic panel   Result Value Ref Range    Sodium 139 136 - 145 mmol/L    Potassium 4.4 3.5 - 5.1 mmol/L    Chloride 106 95 - 110 mmol/L    CO2 23 23 - 29 mmol/L    Glucose 84 70 - 110 mg/dL    BUN, Bld 25 10 - 30 mg/dL    Creatinine 0.7 0.5 - 1.4 mg/dL    Calcium 7.8 (L) 8.7 - 10.5 mg/dL    Total Protein 6.5 6.0 - 8.4 g/dL    Albumin 2.9 (L) 3.5 - 5.2 g/dL    Total Bilirubin 0.5 0.1 - 1.0 mg/dL    Alkaline Phosphatase 73 55 - 135 U/L    AST 29 10 - 40 U/L    ALT 23 10 - 44 U/L    Anion Gap 10 8 - 16 mmol/L    eGFR if African American >60 >60 mL/min/1.73 m^2    eGFR if non African American >60 >60 mL/min/1.73 m^2   Lipase   Result Value Ref Range    Lipase 33 4 - 60 U/L   Magnesium   Result Value Ref Range    Magnesium 1.7 1.6 - 2.6 mg/dL   Phosphorus   Result Value Ref Range    Phosphorus 3.6 2.7 - 4.5 mg/dL   TSH   Result Value Ref Range    TSH 0.769 0.400 - 4.000 uIU/mL   APTT   Result Value Ref Range    aPTT 34.1 (H) 21.0 - 32.0 sec   Urinalysis Microscopic   Result Value Ref Range    RBC, UA 2 0 - 4 /hpf    WBC, UA 1 0 - 5 /hpf    Bacteria, UA Rare None-Occ /hpf    Hyaline Casts, UA 1 0-1/lpf /lpf    Granular Casts, UA 1 (A) None /lpf    Microscopic Comment SEE COMMENT    Lactic acid, plasma   Result Value Ref Range    Lactate (Lactic Acid) 1.3 0.5 - 2.2 mmol/L       Imaging Results:  Imaging Results          CT Head Without Contrast (Final result)  Result time 11/28/17 22:15:44    Final result by Jose AVITIA  MD Andres (11/28/17 22:15:44)                 Impression:     No acute intracranial process noted. No significant change from prior study dated 09/25/2017.      Electronically signed by: JOSE CAMPOS MD  Date:     11/28/17  Time:    22:15              Narrative:    Exam: CT of the head without contrast.    History: Altered mental status, unspecified    Findings:     Diffuse parenchymal atrophy noted, with changes of small vessel disease, within normal limits for the patient's age. No hemorrhage, mass displacement, acute cortical infarct, or abnormal extra-axial fluid collection is identified. Visualized paranasal sinuses and mastoids are clear.                             X-Ray Chest AP Portable (Final result)  Result time 11/28/17 20:35:40    Final result by Jose Campos MD (11/28/17 20:35:40)                 Impression:         No acute cardiopulmonary process noted. No significant change from prior study dated 08/06/2017.      Electronically signed by: JOSE CAMPOS MD  Date:     11/28/17  Time:    20:35              Narrative:    Exam: Chest X-ray, one view.    History: Sepsis    Findings: Chronic appearing interstitial changes are present bilaterally. No infiltrate or effusion. Heart size within normal limits.                             The EKG was ordered, reviewed, and independently interpreted by the ED provider.  Interpretation time: 2035  Rate: 80 BPM  Rhythm: normal sinus rhythm  Interpretation: Minimal voltage criteria for LVH, may be normal variant. Borderline ECG. No STEMI.         The Emergency Provider reviewed the vital signs and test results, which are outlined above.    ED Discussion     12:44 AM: Re-evaluated pt. Pt is resting comfortably and is in no acute distress. D/w pt all pertinent results. D/w pt any concerns expressed at this time. Answered all questions. Pt expresses understanding at this time.   Patient is DNR. Patient's niece has signed DNR consent form.    1:44 AM:   Nolberto discussed the pt's case with Cameron Kathleen NP ([facility]) who will discuss patient's case with Dr. Carrion ([facility]) prior to selecting disposition.    2:07 AM: Dr. Arvizu discussed the pt's case with Cameron Kathleen NP ([facility]) who recommends repeat lactic acid prior to selecting disposition.    2:10 AM: Dr. Arvizu discussed the pt's case with Dr. Carrion ([facility]) who recommends repeat lactic acid.    2:13 AM: Re-evaluated pt. Pt is resting comfortably and is in no acute distress. Discussed with family the need for repeat lactic acid. D/w pt and family all pertinent results. D/w pt and family any concerns expressed at this time. Answered all questions. Pt and family expresses understanding at this time.    2:20 AM: Dr. Arvizu transfers care of pt to Dr. Hernandez, pending lab results.    3:58 AM: Reassessed pt at this time. Discussed with pt all pertinent ED information and results. Discussed pt dx and plan of tx. Gave pt all f/u and return to the ED instructions. All questions and concerns were addressed at this time. Pt expresses understanding of information and instructions, and is comfortable with plan to discharge. Pt is stable for discharge.    I discussed with patient and/or family/caretaker that evaluation in the ED does not suggest any emergent or life threatening medical conditions requiring immediate intervention beyond what was provided in the ED, and I believe patient is safe for discharge.  Regardless, an unremarkable evaluation in the ED does not preclude the development or presence of a serious of life threatening condition. As such, patient was instructed to return immediately for any worsening or change in current symptoms.        ED Medication(s):  Medications   0.9%  NaCl infusion (0 mLs Intravenous Stopped 11/28/17 2309)       Discharge Medication List as of 11/29/2017  3:57 AM          Follow-up Information     Hernán Lincoln MD In 2 days.     Specialty:  Family Medicine  Contact information:  [address]  [address]  [place] [address]  [phone]             [facility] - .    Specialty:  Emergency Medicine  Why:  As needed  Contact information:  [address]  [place] [address] [phone]                   Medical Decision Making    Medical Decision Making:   Clinical Tests:   Lab Tests: Ordered and Reviewed  Radiological Study: Ordered and Reviewed  Medical Tests: Ordered and Reviewed           Scribe Attestation:   Scribe #1: I performed the above scribed service and the documentation accurately describes the services I performed. I attest to the accuracy of the note.    Attending:   Physician Attestation Statement for Scribe #1: I, Jovan Arvizu Jr., MD, personally performed the services described in this documentation, as scribed by Adrianne Isabel, in my presence, and it is both accurate and complete.       Scribe Attestation:   Scribe #2: I performed the above scribed service and the documentation accurately describes the services I performed. I attest to the accuracy of the note.    Attending Attestation:           Physician Attestation for Scribe:    Physician Attestation Statement for Scribe #2: I, Benny Hernandez MD, reviewed documentation, as scribed by Michelle Jordan in my presence, and it is both accurate and complete. I also acknowledge and confirm the content of the note done by Scribe #1.          Clinical Impression       ICD-10-CM ICD-9-CM   1. Dehydration E86.0 276.51   2. Decreased activity R68.89 780.99   3. Altered mental status R41.82 780.97   4. Hypotension, unspecified hypotension type I95.9 458.9       Disposition:   Disposition: Discharged  Condition: Stable         Benny Hernandez MD  11/29/17 8090

## 2017-11-30 LAB — BACTERIA UR CULT: NO GROWTH

## 2017-12-04 LAB
BACTERIA BLD CULT: NORMAL
BACTERIA BLD CULT: NORMAL

## 2022-03-23 NOTE — TELEPHONE ENCOUNTER
----- Message from Lenka Panda sent at 8/11/2017  9:59 AM CDT -----  Contact: yara/MIQUEL  Would like to speak to nurse about pt physical therapy. Please call back at [phone]. thanks  
Pt is staying in a residential care facility. Called facility owner but no answer- left message for Elly (owner) to call back so pt can be set up with physical therapy.   
I have reviewed and confirmed nurses' notes for patient's medications, allergies, medical history, and surgical history.

## (undated) DEVICE — SEE MEDLINE ITEM 157027

## (undated) DEVICE — ELECTRODE REM PLYHSV RETURN 9

## (undated) DEVICE — SUT VICRYL BR 1 GEN 27 CT-1

## (undated) DEVICE — SEE MEDLINE ITEM 157117

## (undated) DEVICE — PAD ABD 8X10 STERILE

## (undated) DEVICE — GAUZE SPONGE 4X4 12PLY

## (undated) DEVICE — SEE MEDLINE ITEM 157131

## (undated) DEVICE — SEE MEDLINE ITEM 152622

## (undated) DEVICE — DRAPE PLASTIC U 60X72

## (undated) DEVICE — COVER OVERHEAD SURG LT BLUE

## (undated) DEVICE — SEE MEDLINE ITEM 146345

## (undated) DEVICE — SUT VICRYL 2-0 36 CT-1

## (undated) DEVICE — GLOVE SURGICAL LATEX SZ 6.5

## (undated) DEVICE — BIT DRILL QCK-CPLG 4.2MM

## (undated) DEVICE — SEE MEDLINE ITEM 157166

## (undated) DEVICE — Device

## (undated) DEVICE — MANIFOLD 4 PORT

## (undated) DEVICE — SEE MEDLINE ITEM 146417

## (undated) DEVICE — STAPLER SKIN PROXIMATE WIDE

## (undated) DEVICE — WIRE GUIDE 3.2MM 400MM
Type: IMPLANTABLE DEVICE | Site: HIP | Status: NON-FUNCTIONAL
Removed: 2017-08-07

## (undated) DEVICE — DRAPE MOBILE C-ARM

## (undated) DEVICE — SOL NS 1000CC

## (undated) DEVICE — GLOVE BIOGEL PI ORTHO PRO SZ7

## (undated) DEVICE — DRAPE VERTICAL ISOLATION